# Patient Record
Sex: MALE | Race: BLACK OR AFRICAN AMERICAN | Employment: OTHER | URBAN - METROPOLITAN AREA
[De-identification: names, ages, dates, MRNs, and addresses within clinical notes are randomized per-mention and may not be internally consistent; named-entity substitution may affect disease eponyms.]

---

## 2017-02-02 ENCOUNTER — APPOINTMENT (OUTPATIENT)
Dept: CT IMAGING | Age: 67
End: 2017-02-02
Attending: EMERGENCY MEDICINE
Payer: MEDICARE

## 2017-02-02 ENCOUNTER — HOSPITAL ENCOUNTER (OUTPATIENT)
Age: 67
Setting detail: OBSERVATION
Discharge: HOME HEALTH | End: 2017-02-04
Attending: EMERGENCY MEDICINE | Admitting: FAMILY MEDICINE
Payer: MEDICARE

## 2017-02-02 DIAGNOSIS — I63.9 CEREBROVASCULAR ACCIDENT (CVA), UNSPECIFIED MECHANISM (HCC): Primary | ICD-10-CM

## 2017-02-02 LAB
ALBUMIN SERPL BCP-MCNC: 3.7 G/DL (ref 3.5–5)
ALBUMIN/GLOB SERPL: 1.1 {RATIO} (ref 1.1–2.2)
ALP SERPL-CCNC: 73 U/L (ref 45–117)
ALT SERPL-CCNC: 20 U/L (ref 12–78)
ANION GAP BLD CALC-SCNC: 7 MMOL/L (ref 5–15)
AST SERPL W P-5'-P-CCNC: 12 U/L (ref 15–37)
BASOPHILS # BLD AUTO: 0 K/UL (ref 0–0.1)
BASOPHILS # BLD: 1 % (ref 0–1)
BILIRUB SERPL-MCNC: 0.6 MG/DL (ref 0.2–1)
BUN SERPL-MCNC: 11 MG/DL (ref 6–20)
BUN/CREAT SERPL: 13 (ref 12–20)
CALCIUM SERPL-MCNC: 8.5 MG/DL (ref 8.5–10.1)
CHLORIDE SERPL-SCNC: 99 MMOL/L (ref 97–108)
CO2 SERPL-SCNC: 28 MMOL/L (ref 21–32)
CREAT SERPL-MCNC: 0.86 MG/DL (ref 0.7–1.3)
EOSINOPHIL # BLD: 0.3 K/UL (ref 0–0.4)
EOSINOPHIL NFR BLD: 5 % (ref 0–7)
ERYTHROCYTE [DISTWIDTH] IN BLOOD BY AUTOMATED COUNT: 13.7 % (ref 11.5–14.5)
GLOBULIN SER CALC-MCNC: 3.5 G/DL (ref 2–4)
GLUCOSE BLD STRIP.AUTO-MCNC: 218 MG/DL (ref 65–100)
GLUCOSE SERPL-MCNC: 210 MG/DL (ref 65–100)
HCT VFR BLD AUTO: 38.1 % (ref 36.6–50.3)
HGB BLD-MCNC: 12.7 G/DL (ref 12.1–17)
LYMPHOCYTES # BLD AUTO: 39 % (ref 12–49)
LYMPHOCYTES # BLD: 2.5 K/UL (ref 0.8–3.5)
MCH RBC QN AUTO: 30.1 PG (ref 26–34)
MCHC RBC AUTO-ENTMCNC: 33.3 G/DL (ref 30–36.5)
MCV RBC AUTO: 90.3 FL (ref 80–99)
MONOCYTES # BLD: 0.4 K/UL (ref 0–1)
MONOCYTES NFR BLD AUTO: 6 % (ref 5–13)
NEUTS SEG # BLD: 3.1 K/UL (ref 1.8–8)
NEUTS SEG NFR BLD AUTO: 49 % (ref 32–75)
PLATELET # BLD AUTO: 260 K/UL (ref 150–400)
POTASSIUM SERPL-SCNC: 3.6 MMOL/L (ref 3.5–5.1)
PROT SERPL-MCNC: 7.2 G/DL (ref 6.4–8.2)
RBC # BLD AUTO: 4.22 M/UL (ref 4.1–5.7)
SERVICE CMNT-IMP: ABNORMAL
SODIUM SERPL-SCNC: 134 MMOL/L (ref 136–145)
TROPONIN I SERPL-MCNC: <0.04 NG/ML
WBC # BLD AUTO: 6.3 K/UL (ref 4.1–11.1)

## 2017-02-02 PROCEDURE — 81003 URINALYSIS AUTO W/O SCOPE: CPT | Performed by: EMERGENCY MEDICINE

## 2017-02-02 PROCEDURE — 99285 EMERGENCY DEPT VISIT HI MDM: CPT

## 2017-02-02 PROCEDURE — 85025 COMPLETE CBC W/AUTO DIFF WBC: CPT | Performed by: EMERGENCY MEDICINE

## 2017-02-02 PROCEDURE — 80053 COMPREHEN METABOLIC PANEL: CPT | Performed by: EMERGENCY MEDICINE

## 2017-02-02 PROCEDURE — 36415 COLL VENOUS BLD VENIPUNCTURE: CPT | Performed by: EMERGENCY MEDICINE

## 2017-02-02 PROCEDURE — 93005 ELECTROCARDIOGRAM TRACING: CPT

## 2017-02-02 PROCEDURE — 84484 ASSAY OF TROPONIN QUANT: CPT | Performed by: EMERGENCY MEDICINE

## 2017-02-02 PROCEDURE — 85610 PROTHROMBIN TIME: CPT | Performed by: EMERGENCY MEDICINE

## 2017-02-02 PROCEDURE — 82962 GLUCOSE BLOOD TEST: CPT

## 2017-02-02 PROCEDURE — 70450 CT HEAD/BRAIN W/O DYE: CPT

## 2017-02-02 RX ORDER — NITROGLYCERIN 0.4 MG/1
0.4 TABLET SUBLINGUAL
COMMUNITY

## 2017-02-02 NOTE — IP AVS SNAPSHOT
[address] [facility] [address] [place] [address] 
[phone] Patient: Yvette Boykin MRN: [id number] EUI:5/32/0880 You are allergic to the following No active allergies Recent Documentation Height Weight BMI Smoking Status 1.829 m 98 kg 29.29 kg/m2 Former Smoker Emergency Contacts Name Discharge Info Relation Home Work Mobile Anali Bynum DISCHARGE CAREGIVER [3] Spouse [3] [phone] About your hospitalization You were admitted on:  February 3, 2017 You last received care in the:  [facility] 6S NEURO-SCI TELE You were discharged on:  February 4, 2017 Unit phone number:  [phone] Why you were hospitalized Your primary diagnosis was: Altered Mental Status Providers Seen During Your Hospitalizations Provider Role Specialty Primary office phone Abad Zuleta MD Attending Provider Emergency Medicine [phone] Indigo Burns MD Attending Provider [facility] [phone] Dav Arguello MD Attending Provider Internal Medicine [phone] Sidra Mckeon MD Attending Provider Internal Medicine [phone] Your Primary Care Physician (PCP) Primary Care Physician Office Phone Office Fax Nely Sanders Follow-up Information Follow up With Details Comments Contact Info Luis Deleon MD In 1 week  [address] [place] [address] 
[phone] Nidhi Austin MD In 1 month [address] [place] [address] 
[phone] Current Discharge Medication List  
  
CONTINUE these medications which have NOT CHANGED Dose & Instructions Dispensing Information Comments Morning Noon Evening Bedtime  
 acetaminophen 325 mg tablet Commonly known as:  TYLENOL Your next dose is: Today, Tomorrow Other:  _________ Dose:  650 mg Take 2 Tabs by mouth every six (6) hours as needed. Refills:  0  
     
   
   
   
  
 aspirin delayed-release 325 mg tablet Your next dose is: Today, Tomorrow Other:  _________ Dose:  325 mg Take 325 mg by mouth daily. Refills:  0  
     
   
   
   
  
 atorvastatin 40 mg tablet Commonly known as:  LIPITOR Your next dose is: Today, Tomorrow Other:  _________ Dose:  20 mg Take 20 mg by mouth daily. Refills:  0  
     
   
   
   
  
 clopidogrel 75 mg Tab Commonly known as:  PLAVIX Your next dose is: Today, Tomorrow Other:  _________ Dose:  75 mg Take 75 mg by mouth daily. Refills:  0  
     
   
   
   
  
 metFORMIN 500 mg tablet Commonly known as:  GLUCOPHAGE Your next dose is: Today, Tomorrow Other:  _________ Dose:  500 mg Take 500 mg by mouth two (2) times daily (with meals). Refills:  0  
     
   
   
   
  
 metoprolol tartrate 50 mg tablet Commonly known as:  LOPRESSOR Your next dose is: Today, Tomorrow Other:  _________ Dose:  25 mg Take 25 mg by mouth daily. Refills:  0  
     
   
   
   
  
 NITROSTAT 0.4 mg SL tablet Generic drug:  nitroglycerin Your next dose is: Today, Tomorrow Other:  _________ Dose:  0.4 mg  
0.4 mg by SubLINGual route every five (5) minutes as needed for Chest Pain. Refills:  0 Discharge Instructions Discharge Instructions PATIENT ID: Luis Perea MRN: [id number] YOB: 1950 DATE OF ADMISSION: 2/2/2017 10:27 PM   
DATE OF DISCHARGE: 2/4/2017 PRIMARY CARE PROVIDER: Alix Newby MD  
 
ATTENDING PHYSICIAN: Camila Jarquin* DISCHARGING PROVIDER: Camila Jarquin MD   
To contact this individual call [phone] and ask the  to page. If unavailable ask to be transferred the Adult Hospitalist Department. DISCHARGE DIAGNOSES stroke CONSULTATIONS: IP CONSULT TO HOSPITALIST 
IP CONSULT TO NEUROLOGY PROCEDURES/SURGERIES: * No surgery found * PENDING TEST RESULTS:  
At the time of discharge the following test results are still pending: FOLLOW UP APPOINTMENTS:  
Follow-up Information Follow up With Details Comments Contact Info Ashli Brasher MD In 1 week  [address] 
[phone] Kem Lawrence MD In 1 month [address] 
[phone] ADDITIONAL CARE RECOMMENDATIONS:  
 
DIET: Cardiac Diet ACTIVITY: Activity as tolerated WOUND CARE: none EQUIPMENT needed: none DISCHARGE MEDICATIONS: 
 See Medication Reconciliation Form · It is important that you take the medication exactly as they are prescribed. · Keep your medication in the bottles provided by the pharmacist and keep a list of the medication names, dosages, and times to be taken in your wallet. · Do not take other medications without consulting your doctor. NOTIFY YOUR PHYSICIAN FOR ANY OF THE FOLLOWING:  
Fever over 101 degrees for 24 hours. Chest pain, shortness of breath, fever, chills, nausea, vomiting, diarrhea, change in mentation, falling, weakness, bleeding. Severe pain or pain not relieved by medications. Or, any other signs or symptoms that you may have questions about. DISPOSITION: 
x  Home With: 
 OT  PT  [facility]  RN  
  
 SNF/Inpatient Rehab/LTAC Independent/assisted living Hospice Other:  
 
 
 
Signed:  
Jack Rodriguez MD 
2/4/2017 
10:46 AM 
 
Discharge Orders None [facility] Announcement We are excited to announce that we are making your provider's discharge notes available to you in [employer].   You will see these notes when they are completed and signed by the physician that discharged you from your recent hospital stay. If you have any questions or concerns about any information you see in [employer], please call the Health Information Department where you were seen or reach out to your Primary Care Provider for more information about your plan of care. Introducing [facility]! Charanzoya Montano introduces [employer] patient portal. Now you can access parts of your medical record, email your doctor's office, and request medication refills online. 1. In your internet browser, go to [email or web address] 2. Click on the First Time User? Click Here link in the Sign In box. You will see the New Member Sign Up page. 3. Enter your [employer] Access Code exactly as it appears below. You will not need to use this code after youve completed the sign-up process. If you do not sign up before the expiration date, you must request a new code. · [employer] Access Code: [email or web address] Expires: 5/3/2017 10:36 PM 
 
4. Enter the last four digits of your Social Security Number (xxxx) and Date of Birth (mm/dd/yyyy) as indicated and click Submit. You will be taken to the next sign-up page. 5. Create a [employer] ID. This will be your [employer] login ID and cannot be changed, so think of one that is secure and easy to remember. 6. Create a [employer] password. You can change your password at any time. 7. Enter your Password Reset Question and Answer. This can be used at a later time if you forget your password. 8. Enter your e-mail address. You will receive e-mail notification when new information is available in [address] 9. Click Sign Up. You can now view and download portions of your medical record. 10. Click the Download Summary menu link to download a portable copy of your medical information. If you have questions, please visit the Frequently Asked Questions section of the [employer] website. Remember, [employer] is NOT to be used for urgent needs. For medical emergencies, dial 911. Now available from your iPhone and Android! General Information Please provide this summary of care documentation to your next provider. Patient Signature:  ____________________________________________________________ Date:  ____________________________________________________________  
  
Juli Rhonda Provider Signature:  ____________________________________________________________ Date:  ____________________________________________________________

## 2017-02-03 ENCOUNTER — APPOINTMENT (OUTPATIENT)
Dept: CT IMAGING | Age: 67
End: 2017-02-03
Attending: NURSE PRACTITIONER
Payer: MEDICARE

## 2017-02-03 ENCOUNTER — APPOINTMENT (OUTPATIENT)
Dept: MRI IMAGING | Age: 67
End: 2017-02-03
Attending: NURSE PRACTITIONER
Payer: MEDICARE

## 2017-02-03 PROBLEM — R41.82 ALTERED MENTAL STATUS: Status: ACTIVE | Noted: 2017-02-03

## 2017-02-03 LAB
AMMONIA PLAS-SCNC: 29 UMOL/L
ANION GAP BLD CALC-SCNC: 8 MMOL/L (ref 5–15)
APPEARANCE UR: CLEAR
ATRIAL RATE: 60 BPM
ATRIAL RATE: 62 BPM
BASOPHILS # BLD AUTO: 0 K/UL (ref 0–0.1)
BASOPHILS # BLD: 1 % (ref 0–1)
BILIRUB UR QL: NEGATIVE
BUN SERPL-MCNC: 11 MG/DL (ref 6–20)
BUN/CREAT SERPL: 16 (ref 12–20)
CALCIUM SERPL-MCNC: 8.6 MG/DL (ref 8.5–10.1)
CALCULATED P AXIS, ECG09: 45 DEGREES
CALCULATED P AXIS, ECG09: 56 DEGREES
CALCULATED R AXIS, ECG10: -19 DEGREES
CALCULATED R AXIS, ECG10: -20 DEGREES
CALCULATED T AXIS, ECG11: -57 DEGREES
CALCULATED T AXIS, ECG11: -74 DEGREES
CHLORIDE SERPL-SCNC: 103 MMOL/L (ref 97–108)
CHOLEST SERPL-MCNC: 112 MG/DL
CO2 SERPL-SCNC: 28 MMOL/L (ref 21–32)
COLOR UR: ABNORMAL
CREAT SERPL-MCNC: 0.7 MG/DL (ref 0.7–1.3)
DIAGNOSIS, 93000: NORMAL
DIAGNOSIS, 93000: NORMAL
EOSINOPHIL # BLD: 0.3 K/UL (ref 0–0.4)
EOSINOPHIL NFR BLD: 5 % (ref 0–7)
ERYTHROCYTE [DISTWIDTH] IN BLOOD BY AUTOMATED COUNT: 13.4 % (ref 11.5–14.5)
EST. AVERAGE GLUCOSE BLD GHB EST-MCNC: 137 MG/DL
GLUCOSE BLD STRIP.AUTO-MCNC: 115 MG/DL (ref 65–100)
GLUCOSE BLD STRIP.AUTO-MCNC: 135 MG/DL (ref 65–100)
GLUCOSE BLD STRIP.AUTO-MCNC: 143 MG/DL (ref 65–100)
GLUCOSE BLD STRIP.AUTO-MCNC: 151 MG/DL (ref 65–100)
GLUCOSE SERPL-MCNC: 153 MG/DL (ref 65–100)
GLUCOSE UR STRIP.AUTO-MCNC: 250 MG/DL
HBA1C MFR BLD: 6.4 % (ref 4.2–6.3)
HCT VFR BLD AUTO: 37.3 % (ref 36.6–50.3)
HDLC SERPL-MCNC: 49 MG/DL
HDLC SERPL: 2.3 {RATIO} (ref 0–5)
HGB BLD-MCNC: 12.4 G/DL (ref 12.1–17)
HGB UR QL STRIP: NEGATIVE
INR PPP: 1 (ref 0.9–1.1)
KETONES UR QL STRIP.AUTO: NEGATIVE MG/DL
LDLC SERPL CALC-MCNC: 48.2 MG/DL (ref 0–100)
LEUKOCYTE ESTERASE UR QL STRIP.AUTO: NEGATIVE
LIPID PROFILE,FLP: NORMAL
LYMPHOCYTES # BLD AUTO: 43 % (ref 12–49)
LYMPHOCYTES # BLD: 2.6 K/UL (ref 0.8–3.5)
MAGNESIUM SERPL-MCNC: 1.9 MG/DL (ref 1.6–2.4)
MCH RBC QN AUTO: 29.7 PG (ref 26–34)
MCHC RBC AUTO-ENTMCNC: 33.2 G/DL (ref 30–36.5)
MCV RBC AUTO: 89.4 FL (ref 80–99)
MONOCYTES # BLD: 0.5 K/UL (ref 0–1)
MONOCYTES NFR BLD AUTO: 8 % (ref 5–13)
NEUTS SEG # BLD: 2.5 K/UL (ref 1.8–8)
NEUTS SEG NFR BLD AUTO: 43 % (ref 32–75)
NITRITE UR QL STRIP.AUTO: NEGATIVE
P-R INTERVAL, ECG05: 156 MS
P-R INTERVAL, ECG05: 160 MS
PH UR STRIP: 6 [PH] (ref 5–8)
PHOSPHATE SERPL-MCNC: 3.7 MG/DL (ref 2.6–4.7)
PLATELET # BLD AUTO: 237 K/UL (ref 150–400)
POTASSIUM SERPL-SCNC: 3.7 MMOL/L (ref 3.5–5.1)
PROT UR STRIP-MCNC: NEGATIVE MG/DL
PROTHROMBIN TIME: 10.6 SEC (ref 9–11.1)
Q-T INTERVAL, ECG07: 402 MS
Q-T INTERVAL, ECG07: 432 MS
QRS DURATION, ECG06: 72 MS
QRS DURATION, ECG06: 72 MS
QTC CALCULATION (BEZET), ECG08: 408 MS
QTC CALCULATION (BEZET), ECG08: 432 MS
RBC # BLD AUTO: 4.17 M/UL (ref 4.1–5.7)
SERVICE CMNT-IMP: ABNORMAL
SODIUM SERPL-SCNC: 139 MMOL/L (ref 136–145)
SP GR UR REFRACTOMETRY: 1.01 (ref 1–1.03)
T4 FREE SERPL-MCNC: 1 NG/DL (ref 0.8–1.5)
TRIGL SERPL-MCNC: 74 MG/DL (ref ?–150)
TROPONIN I SERPL-MCNC: <0.04 NG/ML
TSH SERPL DL<=0.05 MIU/L-ACNC: 2.28 UIU/ML (ref 0.36–3.74)
UROBILINOGEN UR QL STRIP.AUTO: 1 EU/DL (ref 0.2–1)
VENTRICULAR RATE, ECG03: 60 BPM
VENTRICULAR RATE, ECG03: 62 BPM
VLDLC SERPL CALC-MCNC: 14.8 MG/DL
WBC # BLD AUTO: 5.9 K/UL (ref 4.1–11.1)

## 2017-02-03 PROCEDURE — 82962 GLUCOSE BLOOD TEST: CPT

## 2017-02-03 PROCEDURE — 70496 CT ANGIOGRAPHY HEAD: CPT

## 2017-02-03 PROCEDURE — 80048 BASIC METABOLIC PNL TOTAL CA: CPT | Performed by: FAMILY MEDICINE

## 2017-02-03 PROCEDURE — 82140 ASSAY OF AMMONIA: CPT | Performed by: FAMILY MEDICINE

## 2017-02-03 PROCEDURE — 84484 ASSAY OF TROPONIN QUANT: CPT | Performed by: FAMILY MEDICINE

## 2017-02-03 PROCEDURE — 97161 PT EVAL LOW COMPLEX 20 MIN: CPT

## 2017-02-03 PROCEDURE — A9270 NON-COVERED ITEM OR SERVICE: HCPCS | Performed by: FAMILY MEDICINE

## 2017-02-03 PROCEDURE — 99218 HC RM OBSERVATION: CPT

## 2017-02-03 PROCEDURE — 74011000258 HC RX REV CODE- 258: Performed by: FAMILY MEDICINE

## 2017-02-03 PROCEDURE — 74011636637 HC RX REV CODE- 636/637: Performed by: FAMILY MEDICINE

## 2017-02-03 PROCEDURE — A9585 GADOBUTROL INJECTION: HCPCS | Performed by: FAMILY MEDICINE

## 2017-02-03 PROCEDURE — 83036 HEMOGLOBIN GLYCOSYLATED A1C: CPT | Performed by: FAMILY MEDICINE

## 2017-02-03 PROCEDURE — 97116 GAIT TRAINING THERAPY: CPT

## 2017-02-03 PROCEDURE — 83735 ASSAY OF MAGNESIUM: CPT | Performed by: FAMILY MEDICINE

## 2017-02-03 PROCEDURE — 74011636320 HC RX REV CODE- 636/320: Performed by: FAMILY MEDICINE

## 2017-02-03 PROCEDURE — 70498 CT ANGIOGRAPHY NECK: CPT

## 2017-02-03 PROCEDURE — G8979 MOBILITY GOAL STATUS: HCPCS

## 2017-02-03 PROCEDURE — 36415 COLL VENOUS BLD VENIPUNCTURE: CPT | Performed by: FAMILY MEDICINE

## 2017-02-03 PROCEDURE — 93005 ELECTROCARDIOGRAM TRACING: CPT

## 2017-02-03 PROCEDURE — 70553 MRI BRAIN STEM W/O & W/DYE: CPT

## 2017-02-03 PROCEDURE — 80061 LIPID PANEL: CPT | Performed by: FAMILY MEDICINE

## 2017-02-03 PROCEDURE — 84443 ASSAY THYROID STIM HORMONE: CPT | Performed by: FAMILY MEDICINE

## 2017-02-03 PROCEDURE — 74011250636 HC RX REV CODE- 250/636: Performed by: FAMILY MEDICINE

## 2017-02-03 PROCEDURE — 85025 COMPLETE CBC W/AUTO DIFF WBC: CPT | Performed by: FAMILY MEDICINE

## 2017-02-03 PROCEDURE — G8978 MOBILITY CURRENT STATUS: HCPCS

## 2017-02-03 PROCEDURE — 84439 ASSAY OF FREE THYROXINE: CPT | Performed by: FAMILY MEDICINE

## 2017-02-03 PROCEDURE — 84100 ASSAY OF PHOSPHORUS: CPT | Performed by: FAMILY MEDICINE

## 2017-02-03 RX ORDER — ATORVASTATIN CALCIUM 40 MG/1
20 TABLET, FILM COATED ORAL DAILY
COMMUNITY
End: 2018-05-21

## 2017-02-03 RX ORDER — SODIUM CHLORIDE 0.9 % (FLUSH) 0.9 %
10 SYRINGE (ML) INJECTION
Status: COMPLETED | OUTPATIENT
Start: 2017-02-03 | End: 2017-02-03

## 2017-02-03 RX ORDER — MAGNESIUM SULFATE 100 %
4 CRYSTALS MISCELLANEOUS AS NEEDED
Status: DISCONTINUED | OUTPATIENT
Start: 2017-02-03 | End: 2017-02-04 | Stop reason: HOSPADM

## 2017-02-03 RX ORDER — DEXTROSE 50 % IN WATER (D50W) INTRAVENOUS SYRINGE
12.5-25 AS NEEDED
Status: DISCONTINUED | OUTPATIENT
Start: 2017-02-03 | End: 2017-02-04 | Stop reason: HOSPADM

## 2017-02-03 RX ORDER — ASPIRIN 325 MG
325 TABLET, DELAYED RELEASE (ENTERIC COATED) ORAL DAILY
COMMUNITY

## 2017-02-03 RX ORDER — SODIUM CHLORIDE 0.9 % (FLUSH) 0.9 %
5-10 SYRINGE (ML) INJECTION EVERY 8 HOURS
Status: DISCONTINUED | OUTPATIENT
Start: 2017-02-03 | End: 2017-02-04 | Stop reason: HOSPADM

## 2017-02-03 RX ORDER — SODIUM CHLORIDE 0.9 % (FLUSH) 0.9 %
5-10 SYRINGE (ML) INJECTION AS NEEDED
Status: DISCONTINUED | OUTPATIENT
Start: 2017-02-03 | End: 2017-02-04 | Stop reason: HOSPADM

## 2017-02-03 RX ORDER — INSULIN LISPRO 100 [IU]/ML
INJECTION, SOLUTION INTRAVENOUS; SUBCUTANEOUS
Status: DISCONTINUED | OUTPATIENT
Start: 2017-02-03 | End: 2017-02-04 | Stop reason: HOSPADM

## 2017-02-03 RX ADMIN — Medication 10 ML: at 14:00

## 2017-02-03 RX ADMIN — Medication 10 ML: at 07:07

## 2017-02-03 RX ADMIN — SODIUM CHLORIDE 100 ML: 900 INJECTION, SOLUTION INTRAVENOUS at 11:07

## 2017-02-03 RX ADMIN — INSULIN LISPRO 2 UNITS: 100 INJECTION, SOLUTION INTRAVENOUS; SUBCUTANEOUS at 12:44

## 2017-02-03 RX ADMIN — Medication 10 ML: at 11:07

## 2017-02-03 RX ADMIN — INSULIN LISPRO 2 UNITS: 100 INJECTION, SOLUTION INTRAVENOUS; SUBCUTANEOUS at 07:06

## 2017-02-03 RX ADMIN — IOPAMIDOL 100 ML: 755 INJECTION, SOLUTION INTRAVENOUS at 11:07

## 2017-02-03 RX ADMIN — GADOBUTROL 10 ML: 604.72 INJECTION INTRAVENOUS at 15:00

## 2017-02-03 NOTE — ED TRIAGE NOTES
Patient arrives to ED with c/o left extremity weakness which started @ 1200 noon, patient also c/o confusion, and possible left side facial droop, all of which patient and wife says has resolved at this time, patient states he has had 6 \"stroke\" in the past, last being @ May.

## 2017-02-03 NOTE — CONSULTS
Neurology Consult    Patient: Uriel Monaco MRN: [id number]  SSN: [id number]    YOB: 1950  Age: 74 y.o. Sex: male      Subjective:      Uriel Monaco is a 74 y.o. male who is being seen for acute episode yesterday of  Dizziness, confusion, dysarthria,dysphasia ataxia facial changes, left sided weakness LKW at 1100. No medical evaluation  till 2200. He also noted :\"tongue swelling\". Today all sx have resolved with exception of slurred speech, Left facial droop and left sided weakness without numbness. Pt notes above sx are improved from yesterday. Pt has pertinent hx of multiple strokes last one documented was Left cerebellar infarction 2/2016. He normally goes to [place] for his ongoing medical conditions. He had been evalauted for vertebral artery stent placement at [facility]. Pt was deemed not a candidate aftter angiogram demonstrated less stenosis than was originally assessed. Included significant risk factors including DMII,  well controlled, HTN moderate controlled and CAD. Suspected Vertebrobasilar disease. Past Medical History   Diagnosis Date    Diabetes ([address]      type 2    Hypertension     MI, old     Stroke [facility])      Past Surgical History   Procedure Laterality Date    Hx appendectomy      Hx hernia repair        History reviewed. No pertinent family history.   Social History   Substance Use Topics    Smoking status: Former Smoker    Smokeless tobacco: Never Used    Alcohol use No      Current Facility-Administered Medications   Medication Dose Route Frequency Provider Last Rate Last Dose    sodium chloride (NS) flush 5-10 mL  5-10 mL IntraVENous Q8H Surekha Gordon MD   10 mL at 02/03/17 0707    sodium chloride (NS) flush 5-10 mL  5-10 mL IntraVENous PRN Surekha Gordon MD        glucose chewable tablet 16 g  4 Tab Oral PRN Surekha Gordon MD        dextrose (D50W) injection syrg 12.5-25 g  12.5-25 g IntraVENous PRN Surekha Gordon MD        glucagon (GLUCAGEN) injection 1 mg  1 mg IntraMUSCular PRN Indigo Burns MD        insulin lispro (HUMALOG) injection   SubCUTAneous AC&HS Indigo Burns MD   2 Units at 02/03/17 0706        No Known Allergies    Review of Systems:  A comprehensive review of systems was negative except for that written in the History of Present Illness. Objective:     Vitals:    02/03/17 0634 02/03/17 0635 02/03/17 0800 02/03/17 0900   BP:   148/85 153/84   Pulse: 61 60 65 74   Resp: 8 8 10 17   Temp:   97.6 °F (36.4 °C)    SpO2: 97% 97% 98% 98%   Weight:       Height:            Physical Exam:  GENERAL: alert, cooperative, no distress, appears stated age  LYMPHATIC: Cervical, supraclavicular, and axillary nodes normal.   THROAT & NECK: normal and no erythema or exudates noted. LUNG: clear to auscultation bilaterally  HEART: regular rate and rhythm, S1, S2 normal, no murmur, click, rub or gallop  ABDOMEN: soft, non-tender. Bowel sounds normal. No masses,  no organomegaly  EXTREMITIES:  extremities normal, atraumatic, no cyanosis or edema  SKIN: Normal.  PSYCHIATRIC: non focal    Neurologic Exam:  Mental Status:  Alert and oriented x 4. Appropriate affect, mood and behavior. Language:    Mild dysarthria, understandable   Cranial Nerves:   Pupils equal, round and reactive to light. Visual fields full to confrontation. Extraocular movements intact. End gaze nystagmus bilaterally, L>R. L facial droop with L tongue deviate to Right     Hearing intact bilaterally. Shoulder shrug 5/5 bilaterally. Motor:    No pronator drift. Bulk and tone normal.      5/5 power in all extremities proximally and distally. No involuntary movements. Sensation:    Sensation intact throughout to light touch,    Reflexes:    Reflexes are 2+ at the biceps, triceps, patella and achilles     bilaterally.  Negative Babinskis    Coordination & Gait: deferrred      Imaging:    MRI pending, CTA head and neck pending    Assessment:     Hospital Problems  Date Reviewed: 2/3/2017          Codes Class Noted POA    * (Principal)Altered mental status ICD-10-CM: R41.82  ICD-9-CM: 780.97  2/3/2017 Unknown              Plan:     1. Pt has been managed prior to sx with optimal best management, including BP control, DM control, statin therapy and daily ASA 81 mg. He appears to compliant with meds. 2. He has sustained multiple CVAs. Per subjective history. One documented in BSR. 3. Neuro exam today demonstrates slurred speech, with noted improvement per patient. 4. CTA head and neck, MRI ordered, results pending at time of this assessment. 5. Will reexamine the VB contributing. to possible new stroke.     Signed By: Jeaneth Alexander NP     February 3, 2017

## 2017-02-03 NOTE — PROGRESS NOTES
8842:  Attempted to call ED back to get report, Renetta Huynh to call back with report  0333: TRANSFER - IN REPORT:  Verbal report received from Sarah(name) on nUa Ureña  being received from ED(unit) for routine progression of care  Report consisted of patients Situation, Background, Assessment and   Recommendations(SBAR). Information from the following report(s) SBAR, Kardex, ED Summary, Intake/Output and Recent Results was reviewed with the receiving nurse. Opportunity for questions and clarification was provided. Assessment completed upon patients arrival to unit and care assumed. 0354: To ICU 11 with nurse/monitor                 .

## 2017-02-03 NOTE — PROGRESS NOTES
Spiritual Care Assessment/Progress Notes    Nerissa Fiore [id number]  [id number]    1950  74 y.o.  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 [facility] 02/26/2016    CAD (coronary artery disease) 02/09/2016    Hypertension 02/09/2016    Type II diabetes mellitus ([facility] 02/09/2016    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   []         Not able to assess due to medical condition    Services Provided Today:  []         crisis intervention    []         reading Scriptures  [x]         spiritual assessment    []         prayer  []         empathic listening/emotional support  []         rites and rituals (cite in comments)  []         life review     []         Moravian support  []         theological development   []         advocacy  []         ethical dialog     []         blessing  []         bereavement support    []         support to family  []         anticipatory grief support   [x]         help with AMD  []         spiritual guidance    []         meditation      Spiritual Care Needs  []         Emotional Support  []         Spiritual/Jewish Care  []         Loss/Adjustment  []         Advocacy/Referral                /Ethics  [x]         No needs expressed at               this time  []         Other: (note in               comments)  [address]  []         Follow up visits with               pt/family  []         Provide materials  []         Schedule sacraments  []         Contact Community               Clergy  [x]         Follow up as needed  []         Other: (note in               comments)     Comments: We received an in-basket request to consult with Mr. Brittany Benitez about completing an AMD. I spoke with him and his wife about it and his wife said that she has a copy of his completed AMD at home. She plans to bring it in to be placed in his file here. They had no additional spiritual/pastoral care needs at the time, and I encouraged Mr. Brittany Benitez to have us paged at any time if needed. Rev. Brynn Serrano M.Div, [facility]

## 2017-02-03 NOTE — PROGRESS NOTES
Problem: Mobility Impaired (Adult and Pediatric)  Goal: *Acute Goals and Plan of Care (Insert Text)  Physical Therapy Goals  Initiated 2/3/2017  1. Patient will move from supine to sit and sit to supine , scoot up and down and roll side to side in bed with independence within 7 day(s). 2. Patient will transfer from bed to chair and chair to bed with supervision/set-up using the least restrictive device within 7 day(s). 3. Patient will perform sit to stand with supervision/set-up within 7 day(s). 4. Patient will ambulate with supervision/set-up for 150 feet with the least restrictive device within 7 day(s). 5. Patient will ascend/descend 6 stairs with 2 handrail(s) with supervision/set-up within 7 day(s). 6. Patient will improve Garcia Balance score by 7 points within 7 days. PHYSICAL THERAPY EVALUATION- NEURO POPULATION     Patient: Louana Osler (52 y.o. male)  Date: 2/3/2017  Primary Diagnosis: altered mental status/ dizziness/ataxia        Precautions:   Fall      ASSESSMENT :  Based on the objective data described below, the patient presents with decreased independence with mobility compared to baseline level prior to admission due impaired balance and decreased safety awareness. Patient cleared by nursing for mobility and agreeable to participate in mobility assessment. Patient vital signs within normal limits. Patient able to perform bed mobility and achieve EOB sitting with supervision and additional time. Once sitting, patient participated in strength assessment revealing no strength deficits. No reports of increased pain/discomfort in sitting. Patient performed sit<>stand tx with CGA, additional time, and demonstrates even WB bilaterally and good immediate standing balance with UE support of RW. Patient able to ambulate with CGA and RW x 150 feet with short, shuffled steps. Patient reports he uses a rollator at home and takes seated rest breaks as needed. PMH significant for previous CVAs.  Patient returned to room and participated in [address] standing in front of chair. He had difficulty on items where he could not use additional support and narrow YEHUDA. He a moderate fall risk at this time. Educated patient on impaired balance and encouraged mobility with assistance/supervision. Will continue to follow to progress towards acute care goals. Recommend outpatient vs HHPT at d/c to continue to optimize independence and safety with mobility. Next session, would like to assess stairs. Patient will benefit from skilled intervention to address the above impairments. Patients rehabilitation potential is considered to be Good  Factors which may influence rehabilitation potential include:   [ ]           None noted  [ ]           Mental ability/status  [ ]           Medical condition  [ ]           Home/family situation and support systems  [ ]           Safety awareness  [ ]           Pain tolerance/management  [ ]           Other:        PLAN :  Recommendations and Planned Interventions:  [ ]             Bed Mobility Training             [ ]      Neuromuscular Re-Education  [ ]             Transfer Training                   [ ]      Orthotic/Prosthetic Training  [ ]             Gait Training                         [ ]      Modalities  [ ]             Therapeutic Exercises           [ ]      Edema Management/Control  [ ]             Therapeutic Activities            [ ]      Patient and Family Training/Education  [ ]             Other (comment):  Frequency/Duration: Patient will be followed by physical therapy 5 times a week to address goals. Discharge Recommendations: Home Health and Outpatient  Further Equipment Recommendations for Discharge: continue with rollator       SUBJECTIVE:   Patient stated I just dont know what happened to me.       OBJECTIVE DATA SUMMARY:   HISTORY:    Past Medical History   Diagnosis Date    Diabetes ([address]         type 2    Hypertension      MI, old     [facility] St[facility])       Past Surgical History   Procedure Laterality Date    Hx appendectomy        Hx hernia repair         Prior Level of Function/Home Situation: Mod I. Lives with wife. Personal factors and/or comorbidities impacting plan of care:      Home Situation  Home Environment: Private residence  # Steps to Enter: 3  Rails to Enter: Yes  Hand Rails : Bilateral  One/Two Story Residence: One story  Living Alone: No  Support Systems: Spouse/Significant Other/Partner  Patient Expects to be Discharged to[de-identified] Private residence  Current DME Used/Available at Home: Johnette Severe, rollator, Walker, rolling     EXAMINATION/PRESENTATION/DECISION MAKING:   Critical Behavior:  Neurologic State: Eyes open to voice, Sleeping  Orientation Level: Oriented X4  Cognition: Follows commands, Appropriate decision making, Appropriate for age attention/concentration, Appropriate safety awareness        Strength:    Strength: Within functional limits                    Coordination:  Coordination: Within functional limits  Tone & Sensation:   Tone: Normal              Sensation: Intact               Range Of Motion:  AROM: Within functional limits                       Functional Mobility:  Bed Mobility:        Sit to Supine: Supervision     Transfers:  Sit to Stand: Contact guard assistance  Stand to Sit: Contact guard assistance  Stand Pivot Transfers: Contact guard assistance                    Balance:   Sitting: Intact  Standing: Intact; With support  Ambulation/Gait Training:  Distance (ft): 150 Feet (ft)  Assistive Device: Gait belt;Walker, rolling  Ambulation - Level of Assistance: Contact guard assistance     Gait Description (WDL): Exceptions to WDL  Gait Abnormalities: Decreased step clearance;Shuffling gait (Decreased knee and hip flexion with swing phase)        Base of Support: Widened     Speed/Kristen: Slow  Step Length: Right shortened;Left shortened                               Functional Measure  Garcia Balance Test: Sitting to Standing: 3  Standing Unsupported: 1  Sitting with Back Unsupported: 4  Standing to Sitting: 3  Transfers: 2  Standing Unsupported with Eyes Closed: 2  Standing Unsupported with Feet Together: 0  Reach Forward with Outstretched Arm: 1   Object: 3  Turn to Look Over Shoulders: 3  Turn 360 Degrees: 3  Alternate Foot on Step/Stool: 2  Standing Unsupported One Foot in Front: 0  Stand on One Le  Total: 27             56=Maximum possible score;   0-20=High fall risk  21-40=Moderate fall risk   41-56=Low fall risk      Garcia Balance Test and G-code impairment scale:  Percentage of Impairment CH     0%    CI     1-19% CJ     20-39% CK     40-59% CL     60-79% CM     80-99% CN      100%   Garcia   Score 0-56 56 45-55 34-44 23-33 12-22 1-11 0         G codes: In compliance with CMSs Claims Based Outcome Reporting, the following G-code set was chosen for this patient based on their primary functional limitation being treated: The outcome measure chosen to determine the severity of the functional limitation was the Lizzie Moynahan with a score of 28/56 which was correlated with the impairment scale.       · Mobility - Walking and Moving Around:               - CURRENT STATUS:    CK - 40%-59% impaired, limited or restricted               - GOAL STATUS:           CJ - 20%-39% impaired, limited or restricted               - D/C STATUS:                       ---------------To be determined---------------       Physical Therapy Evaluation Charge Determination   History Examination Presentation Decision-Making   MEDIUM  Complexity : 1-2 comorbidities / personal factors will impact the outcome/ POC  LOW Complexity : 1-2 Standardized tests and measures addressing body structure, function, activity limitation and / or participation in recreation  LOW Complexity : Stable, uncomplicated  LOW Complexity : FOTO score of       Based on the above components, the patient evaluation is determined to be of the following complexity level: LOW         Pain:  Pain Scale 1: Numeric (0 - 10)  Pain Intensity 1: 0              Activity Tolerance:   No apparent distress  Please refer to the flowsheet for vital signs taken during this treatment. After treatment:   [ ]     Patient left in no apparent distress sitting up in chair  [X]     Patient left in no apparent distress in bed  [X]     Call bell left within reach  [X]     Nursing notified  [ ]     Caregiver present  [ ]     Bed alarm activated      COMMUNICATION/EDUCATION:   The patients plan of care was discussed with: Registered Nurse. Patient was educated regarding His deficit(s) of impaired balance as this relates to His diagnosis of AMS. He demonstrated Good understanding as evidenced by verbal recall and demonstration. [X]  Fall prevention education was provided and the patient/caregiver indicated understanding. [X]  Patient/family have participated as able in goal setting and plan of care. [X]  Patient/family agree to work toward stated goals and plan of care. [ ]  Patient understands intent and goals of therapy, but is neutral about his/her participation. [ ]  Patient is unable to participate in goal setting and plan of care.      Thank you for this referral.  Vannessa Ochoa, PT , DPT   Time Calculation: 21 mins

## 2017-02-03 NOTE — H&P
[address] [facility] [place] [place] [address]   HISTORY AND PHYSICAL       Name:  Barbara Hernandez   MR#:  [id number]   :  1950   Account #:  [de-identified]        Date of Adm:  2017       CHIEF COMPLAINT: Dizziness. HISTORY OF PRESENT ILLNESS: A 38-year-old    male with past medical history of type 2 diabetes mellitus, stroke and   myocardial infarction, hypertension, presented to the emergency   department from home with reported chief complaint of dizziness. At   current, the patient is a limited historian. Majority of history had been   obtained from review of ED and medical reports. Per the ER, initial   reports, the patient had dizziness, left-sided facial tremors, confusion,   balance problems, tongue swelling, difficulty speaking and difficulty   swallowing which started yesterday. The patient does not provide   much in terms of further details regarding the same. He also reportedly   had a headache starting yesterday afternoon. He notes the symptoms   have resolved. He notes that his tongue was swollen, but he was   \"eating. \" He denies taking any new medications, new products or   eating any new foods. The ER also noted the patient's wife noted the   patient had less controlled of his hand movements. Reportedly, he has   had 5-6 strokes in 2016 with some residual hearing loss and gait   abnormality. He reportedly, however, has been able to ambulate using   a walker. There is no reports of syncope, loss of consciousness, neck   pain, back pain, chest pain, shortness of breath, cough, congestion,   abdominal pain, nausea, vomiting, diarrhea, melena, dysuria,   hematuria, calf pain, swelling, edema, fever, chills, cough or falls. On   arrival in the emergency department, initial recorded vital signs were   blood pressure 161/92, heart rate 76, respiratory rate 16, saturation   97% on room air.  CT of the head without contrast showed no acute intracranial abnormalities with evolution of chronic left cerebellar infarct   since last year. The patient is now seen for admission to the hospitalist   service for continued evaluation and treatment. PAST MEDICAL HISTORY: Type 2 diabetes mellitus, stroke, hearing   impairment, gait abnormality. Myocardial infarction, hypertension. PAST SURGICAL HISTORY:   1. Appendectomy. 2. Hernia repair. MEDICATIONS:   1. Tylenol 650 mg p.o. q.6h. p.r.n.   2. Aspirin 81 mg p.o. daily. 3. Atorvastatin 20 mg p.o. at bedtime. 4. Clopidogrel 75 mg p.o. daily. 5. Metformin 500 mg p.o. b.i.d.   6. Metoprolol 25 mg p.o. b.i.d.   7. Nitroglycerin 0.4 mg sublingual q.5 minutes p.r.n. ALLERGIES: NO KNOWN DRUG ALLERGIES. SOCIAL HISTORY: Reportedly quit smoking cigarettes 20 years ago. Positive occasional alcohol. Denies daily consumption. Denies illicit   drugs. FAMILY HISTORY: Stroke - mother. Heart disease - mother. REVIEW OF SYSTEMS   An 11 systems reviewed, pertinent positives as HPI, otherwise   negative. PHYSICAL EXAMINATION   VITAL SIGNS: Temperature is 98.4 degrees Fahrenheit, blood   pressure 142/78, heart rate 60, respiratory rate 14, O2 saturation 99%   on room air. Recorded weight is 222 pounds (100.7 kg). Recorded   height 6 foot 0 inches tall. GENERAL: Overweight male in no acute respiratory distress. PSYCHIATRIC: The patient is awake, alert, oriented x3. NEUROLOGIC: Best response. Moves extremities x4. Sensation is   grossly intact without slurred speech, facial droop, no pronator drift. HEENT: Normocephalic, atraumatic. PERRLA is intact. Sclerae are   anicteric. Conjunctivae clear. Nares are patent. Oropharynx clear. Tongue is midline, nonedematous. NECK: Supple, without lymphadenopathy, JVD, carotid bruits, no   thyromegaly. Nontender, full range of motion. No acute palpable   cervical deformity. No stridor. LYMPH: Negative for cervical, supraclavicular adenopathy. RESPIRATORY: Lungs are clear to auscultation bilaterally. CARDIOVASCULAR: Heart regular rate and rhythm, normal S1, S2,   no murmurs, rubs or gallops. GI: Abdomen soft, nontender, nondistended. Normoactive bowel   sounds. No guarding or rigidity. No auscultated abdominal sounds. No   palpable abdominal mass. BACK: No CVA tenderness. No step-off deformity. MUSCULOSKELETAL: Full range of motion. Negative for calf   tenderness. VASCULAR: 2+ radial, 1+ dorsalis pedis pulses, without cyanosis. Positive for clubbing and negative for edema. SKIN: Warm and dry. LABORATORY DATA: I REVIEWED AS FOLLOWS: Sodium of 134,   potassium 3.6, chloride 99, CO2 of 28, BUN of 11, creatinine of 0.86,   glucose 210, anion gap 7, calcium is 8.5, GFR greater than 60, total   bilirubin 0.6, total protein 7.2, albumin 3.7, ALT 20, AST is 12, alkaline   phosphatase is 73. Troponin I of less than 0.04. WBC 6.3, hemoglobin   12.7, hematocrit 38.1, platelets of 949, neutrophils at 49%. Urinalysis:   Leukocyte esterase negative, nitrites negative, urobilinogen 1.0,   bilirubin negative, blood negative, ketones, negative. Glucose 250,   protein negative, pH 6.0, specific gravity 1.015, INR 1.0, PT of 10.6. CT of the head without contrast: Results reviewed as noted in HPI. A   12-lead EKG: Normal sinus rhythm, ST and T-wave changes in inferior   lateral leads at 60 beats per minute. IMPRESSION: A 67-year-old male with noted past medical history,   now admitted with altered mental status, ataxia, dizziness, tremors,   dysarthria, dysphagia. 1. Altered mental status. At this time, admit the patient for observation. Placed on neurovascular checks, fall precautions. Consult with   neurologist. Consider for vertebrobasilar insufficiency. Consult with   physical and occupational therapists. 2. Ataxia. Plan as noted above. 3. Dizziness. Plan as noted. 4. Dysarthria, currently, no noted speech impairment.  Order dysphagia screen. If he were not to pass, then proceed with speech pathology   evaluation. Tongue edema not present on exam. There is no evidence   of any airway obstruction. The patient is otherwise in no acute distress. 5. Hypertension. Resume back on home medicines if he passes   dysphagia screen. 6. Type 2 diabetes mellitus. Placed on Humalog correctional coverage,   scheduled Accu-Cheks. 7. Tremors, currently resolved. 8. Venous thromboembolism prophylaxis. Sequential compression   devices, bilateral lower extremities. CHRIS Esquivel MD      MP / CD   D:  02/03/2017   04:22   T:  02/03/2017   04:59   Job #:  [id number]

## 2017-02-03 NOTE — PROGRESS NOTES
Care Management: Chart reviewed. Patient driven by spouse to ED for dizziness, left sided facial tremors,difficulty with swallowing & speaking, & dropping things. Admitted to ICU for neuro checks. PMH: Type 2 DM, MI, HTN, multiple strokes. I spoke to patient at bedside & explained role of Care Management. Confirmed address, phone, PCP, & insurance. Patient has an Advanced Directive & his wife, Hilary Chowdhury at [phone], is the medical POA. Prescriptions are filled at the [facility] on [address]. Living Situation: Patient lives with his wife in a one story home with 3 entry steps. DME includes walker, hospital bed, & bedside table. He has had Home Health previously, but could not recall vendor name. Per patient, he has been independent with ADL's & IADL's, except driving. His wife drives him to appointments. Disposition Planning: Patient currently here under Observation status. He is hopeful to be discharged home, noting that his wife will be able to drive him home. Care Management will continue to follow. Sussy RN BSN CCM     Care Management Interventions  PCP Verified by CM: Yes (PCP: Dr. Yg Jean-Baptiste at [phone].)  Last Visit to PCP: 01/27/17  Palliative Care Consult (Criteria: CHF and RRAT>21): No  Reason for No Palliative Care Consult: Other (see comment) (Does not meet criteria.  )  Mode of Transport at Discharge:  Other (see comment) (Patient's wife, Hilary Chowdhury at [phone], will be able to drive patient home at discharge.)  Transition of Care Consult (CM Consult):  (TBD, likely home with no services.  )  MyChart Signup: No  Discharge Durable Medical Equipment: No  Physical Therapy Consult: No  Occupational Therapy Consult: No  Speech Therapy Consult: No  Current Support Network: Lives with Spouse, Own Home (Lives with wife in one story home, with 3 entry steps.)  Confirm Follow Up Transport: Family (Family will be able to drive patient home. )  Plan discussed with Pt/Family/Caregiver: Yes  Discharge Location  Discharge Placement: Home (Anticipate discharge home. )

## 2017-02-03 NOTE — ED PROVIDER NOTES
HPI Comments: 74 y.o. male with past medical history significant for Type II DM, MI, HTN, stroke, appendectomy and hernia repair who presents from home with chief complaint of dizziness. Pt c/o dizziness, left sided facial tremors, confusion, balance problems and tongue swelling that started at noon today (11 hours ago). Pt also c/o a slight headache started at 1400 (9 hours ago) that is now resolved. Pt states that he has some difficulty speaking and difficulty swallowing but no difficulty finding his words. Pt's wife states that pt has been having less control of his L hand movements today and is dropping things. Pt's wife states that pt had 5 or 6 strokes in early 2016 and had some residue effects such as hearing loss and gait problems. Pt's wife reports that pt usually walks well at home with a walker. Pt is on ASA. Pt denies any chest pain, SOB, abdominal pain, nausea, vomiting, and urinary symptoms. There are no other acute medical concerns at this time. PCP: Yanick Taylor MD    Note written by Eduardo Gregory, as dictated by Juan Lane MD 10:47 PM        The history is provided by the patient and the spouse. No  was used. Past Medical History:   Diagnosis Date    [address] [address]      type 2    Hypertension     MI, old     Stroke [facility])        Past Surgical History:   Procedure Laterality Date    Hx appendectomy      Hx hernia repair           History reviewed. No pertinent family history. Social History     Social History    Marital status:      Spouse name: N/A    Number of children: N/A    Years of education: N/A     Occupational History    Not on file.      Social History Main Topics    Smoking status: Former Smoker    Smokeless tobacco: Never Used    Alcohol use No    Drug use: No    Sexual activity: Not on file     Other Topics Concern    Not on file     Social History Narrative         ALLERGIES: Review of patient's allergies indicates no known allergies. Review of Systems   HENT: Positive for trouble swallowing. Respiratory: Negative for chest tightness and shortness of breath. Cardiovascular: Negative for chest pain. Gastrointestinal: Negative for abdominal pain, nausea and vomiting. Genitourinary: Negative for difficulty urinating, dysuria, frequency and hematuria. Musculoskeletal: Positive for gait problem. Neurological: Positive for dizziness, facial asymmetry, speech difficulty, weakness and headaches. Psychiatric/Behavioral: Positive for confusion. All other systems reviewed and are negative. Vitals:    02/02/17 2224   BP: (!) 161/92   Pulse: 76   Resp: 16   Temp: 98.4 °F (36.9 °C)   SpO2: 97%   Weight: 104.1 kg (229 lb 6.4 oz)   Height: 6' (1.829 m)            Physical Exam   Constitutional: He is oriented to person, place, and time. He appears well-developed and well-nourished. HENT:   Head: Normocephalic and atraumatic. Mouth/Throat: Oropharynx is clear and moist.   Left sided facial droop   Eyes: Conjunctivae are normal.   Neck: Normal range of motion. Neck supple. Cardiovascular: Normal rate, regular rhythm and normal heart sounds. Pulmonary/Chest: Effort normal and breath sounds normal.   Abdominal: Soft. Bowel sounds are normal. There is no tenderness. Musculoskeletal: He exhibits no edema or tenderness. Mild weakness in left arm and leg. Slight difficulty with heel shin, finger to nose on left side. Neurological: He is alert and oriented to person, place, and time. Skin: Skin is warm and dry. Psychiatric: He has a normal mood and affect. His behavior is normal.   Nursing note and vitals reviewed. Note written by Eduardo Rivera, as dictated by Skye Mahoney MD 10:47 PM        [facility]  ED Course       Procedures    CONSULT NOTE:  12:46 AM Skye Mahoney MD spoke with Dr. Beatris Serrano, Consult for Hospitalist.  Discussed available diagnostic tests and clinical findings.   He is in agreement with care plans as outlined. Dr. Samuel Barrera recommends admitting the patient. 12:46 AM  Patient is being admitted to the hospital.  The results of their tests and reasons for their admission have been discussed with them and/or available family. They convey agreement and understanding for the need to be admitted and for their admission diagnosis. Consultation will be made now with the inpatient physician for hospitalization. ED EKG interpretation:  Rhythm: normal sinus rhythm; and regular . Rate (approx.): 60; Axis: normal; P wave: normal; QRS interval: normal ; ST/T wave: non-specific changes; This EKG was interpreted by Robina Montano MD,ED Provider. A/P:  1. CVA   2. L sided weakness.

## 2017-02-03 NOTE — ED NOTES
Pt resting in bed with eyes closed. Skin warm and dry. Respirations even and unlabored. Wife updated on plan of care, pt awaiting bed placement. Wife going home now.

## 2017-02-03 NOTE — DIABETES MGMT
DTC Consult Note    Recommendations/ Comments: Met with patient and wife. Patient with 6.4% A1C indicating good control prior to admit. Patient and wife appear to be knowledgeable about medication, monitoring and meal planning. Consult received for:  [x]             Assessment of home management    [facility] Patient is a 74 y.o. male with a documented history of Type 2 Diabetes on oral agent (monotherapy): metformin (generic) at home. BG monitoring at home 2 times per week. Patient reports BG levels at home trend: stable. Assessed and instructed patient on the following:   ·  interpretation of lab results, blood sugar goals, complications of diabetes mellitus and nutrition    Provided patient with the following: [x]             Survival skills education materials               [x]             Outpatient DTC contact number                  Discussed with patient and/or family need for follow up appointment for diabetes management after discharge. A1c:   Lab Results   Component Value Date/Time    Hemoglobin A1c 6.4 02/03/2017 04:01 AM       Recent Glucose Results:   Lab Results   Component Value Date/Time     (H) 02/03/2017 04:01 AM     (H) 02/02/2017 10:53 PM    GLUCPOC 143 (H) 02/03/2017 07:02 AM    GLUCPOC 218 (H) 02/02/2017 10:26 PM        Lab Results   Component Value Date/Time    Creatinine 0.70 02/03/2017 04:01 AM       Active Orders   Diet    DIET CARDIAC Soft Solids; Consistent Carb 1800kcal        PO intake: No data found. Current hospital DM medication: Humalog for correction    Will continue to follow as needed. Thank you.     Rachel Betancourt, MS, RN, CDE

## 2017-02-03 NOTE — PROGRESS NOTES
Physical Therapy  2/3/17    Order received. Patient chart reviewed. Attempting to see patient, however currently transferring off the floor for MRI. Will follow up if able when patient returns if appropriate. Of note- patient able to ambulate with PCT from bed to w/c in room. Gladys Sauceda, PT, DPT

## 2017-02-03 NOTE — ROUTINE PROCESS
Report called to [facility], RN. Floor is ready to receive pt. Pt remains awake and alert, skin warm and dry. Respirations even and unlabored. Pt used urinal at bedside. Pt transferred to floor via stretcher on cardiopulmonary monitoring by this RN.

## 2017-02-03 NOTE — PROGRESS NOTES
Admission Medication Reconciliation:    Information obtained from: [facility] (17) / Patient's Wife    Chief Complaint for this Admission:  AMS, Extremity Weakness    Allergies:  Review of patient's allergies indicates no known allergies. Comments/Recommendations: Discussed with patient and wife about PTA medications. 1) Modified ASA dose to 325 (from 81) daily - Patient's wife stated that he has been increased to regular strength for his heart health  2) Modified Atorvastatin dose - Patient has been instructed to take half a tablet (20mg) of Atorvastatin 40mg tablet daily (was listed as Atorvastatin 20mg daily)  3) Modified Metoprolol dose - Patient has been instructed to take half a tablet (25mg) of Metoprolol Tartrate 50mg daily (was listed as Metoprolol Tartrate 25mg BID)  4) Reports that he rarely uses Acetaminophen  5) He has not needed to take his Nitroglycerin  6) He has taken his chronic medications yesterday    Prior to Admission Medications:   Prior to Admission Medications   Prescriptions Last Dose Informant Patient Reported? Taking?   acetaminophen (TYLENOL) 325 mg tablet 2017  Yes Yes   Sig: Take 2 Tabs by mouth every six (6) hours as needed. aspirin delayed-release 325 mg tablet 2017  Yes Yes   Sig: Take 325 mg by mouth daily. atorvastatin (LIPITOR) 40 mg tablet 2017  Yes Yes   Sig: Take 20 mg by mouth daily. clopidogrel (PLAVIX) 75 mg tablet 2017  Yes Yes   Sig: Take 75 mg by mouth daily. metFORMIN (GLUCOPHAGE) 500 mg tablet 2017  Yes Yes   Sig: Take 500 mg by mouth two (2) times daily (with meals). metoprolol (LOPRESSOR) 50 mg tablet 2017  Yes Yes   Sig: Take 25 mg by mouth daily. nitroglycerin (NITROSTAT) 0.4 mg SL tablet   Yes Yes   Si.4 mg by SubLINGual route every five (5) minutes as needed for Chest Pain.       Facility-Administered Medications: None     Jeni Thompson  PharmD Candidate   [facility]

## 2017-02-04 VITALS
RESPIRATION RATE: 12 BRPM | TEMPERATURE: 98 F | HEIGHT: 72 IN | BODY MASS INDEX: 29.26 KG/M2 | WEIGHT: 216 LBS | HEART RATE: 70 BPM | OXYGEN SATURATION: 96 % | SYSTOLIC BLOOD PRESSURE: 110 MMHG | DIASTOLIC BLOOD PRESSURE: 61 MMHG

## 2017-02-04 LAB
GLUCOSE BLD STRIP.AUTO-MCNC: 120 MG/DL (ref 65–100)
GLUCOSE BLD STRIP.AUTO-MCNC: 186 MG/DL (ref 65–100)
GLUCOSE BLD STRIP.AUTO-MCNC: 202 MG/DL (ref 65–100)
SERVICE CMNT-IMP: ABNORMAL

## 2017-02-04 PROCEDURE — A9270 NON-COVERED ITEM OR SERVICE: HCPCS | Performed by: FAMILY MEDICINE

## 2017-02-04 PROCEDURE — 74011636637 HC RX REV CODE- 636/637: Performed by: FAMILY MEDICINE

## 2017-02-04 PROCEDURE — 99218 HC RM OBSERVATION: CPT

## 2017-02-04 PROCEDURE — 82962 GLUCOSE BLOOD TEST: CPT

## 2017-02-04 RX ADMIN — Medication 10 ML: at 06:46

## 2017-02-04 RX ADMIN — INSULIN LISPRO 2 UNITS: 100 INJECTION, SOLUTION INTRAVENOUS; SUBCUTANEOUS at 12:36

## 2017-02-04 NOTE — PROGRESS NOTES
Pt not on meds here     FOR D/C asa 81 vs 325 as Pt on 325 as OP , awaiting clarification from neuro prior to d.c

## 2017-02-04 NOTE — DISCHARGE INSTRUCTIONS
Discharge Instructions       PATIENT ID: Luis Perea  MRN: [id number]   YOB: 1950    DATE OF ADMISSION: 2/2/2017 10:27 PM    DATE OF DISCHARGE: 2/4/2017    PRIMARY CARE PROVIDER: Alix Newby MD     ATTENDING PHYSICIAN: Camila Jarquin*  DISCHARGING PROVIDER: Camila Jarquin MD    To contact this individual call [phone] and ask the  to page. If unavailable ask to be transferred the Adult Hospitalist Department. DISCHARGE DIAGNOSES stroke     CONSULTATIONS: IP CONSULT TO HOSPITALIST  IP CONSULT TO NEUROLOGY    PROCEDURES/SURGERIES: * No surgery found *    PENDING TEST RESULTS:   At the time of discharge the following test results are still pending:     FOLLOW UP APPOINTMENTS:   Follow-up Information     Follow up With Details Comments Toni Cardoso MD In 1 week  [address] [place] [address]      Daly Watkins MD In 1 month [place] [id number]  [phone]             ADDITIONAL CARE RECOMMENDATIONS:     DIET: Cardiac Diet      ACTIVITY: Activity as tolerated    WOUND CARE: none    EQUIPMENT needed: none      DISCHARGE MEDICATIONS:   See Medication Reconciliation Form    · It is important that you take the medication exactly as they are prescribed. · Keep your medication in the bottles provided by the pharmacist and keep a list of the medication names, dosages, and times to be taken in your wallet. · Do not take other medications without consulting your doctor. NOTIFY YOUR PHYSICIAN FOR ANY OF THE FOLLOWING:   Fever over 101 degrees for 24 hours. Chest pain, shortness of breath, fever, chills, nausea, vomiting, diarrhea, change in mentation, falling, weakness, bleeding. Severe pain or pain not relieved by medications. Or, any other signs or symptoms that you may have questions about.       DISPOSITION:  x  Home With:   OT  PT  [facility]  RN       SNF/Inpatient Rehab/LTAC Independent/assisted living    Hospice    Other:         Signed:   Celia Huynh MD  2/4/2017  10:46 AM

## 2017-02-04 NOTE — PROGRESS NOTES
Assumed care of patient at this time. Resting comfortably in bed. No complaints of pain at this time.

## 2017-02-04 NOTE — PROGRESS NOTES
CM consult noted for home health PT. CM spoke to patient for choice of agency; patient has no preference. Referral sent to [address] Referral sent through [address] [facility]. Awaiting acceptance confirmation. Spoke to [employer] for [facility] [place]; accepting this case pending PCP approval on Monday.

## 2017-02-04 NOTE — PROGRESS NOTES
Bedside and Verbal shift change report given to Kota Ibrahim RN (oncoming nurse) by Marce Lizarraga (offgoing nurse). Report included the following information SBAR, Kardex, Intake/Output, MAR and Recent Results.

## 2017-02-04 NOTE — DISCHARGE SUMMARY
Discharge Summary       PATIENT ID: Terrance Wallis  MRN: [id number]   YOB: 1950    DATE OF ADMISSION: 2/2/2017 10:27 PM    DATE OF DISCHARGE: 2/4/2017  PRIMARY CARE PROVIDER: Kyle Mccormack MD     ATTENDING PHYSICIAN:   DISCHARGING PROVIDER: Freida Kyle MD    To contact this individual call [phone] and ask the  to page. If unavailable ask to be transferred the Adult Hospitalist Department. CONSULTATIONS: IP CONSULT TO HOSPITALIST  IP CONSULT TO NEUROLOGY    PROCEDURES/SURGERIES: * No surgery found *    ADMITTING DIAGNOSES & HOSPITAL COURSE:   A 53-year-old    male with past medical history of type 2 diabetes mellitus, stroke and   myocardial infarction, hypertension, presented to the emergency   department from home with reported chief complaint of dizziness. At   current, the patient is a limited historian. Majority of history had been   obtained from review of ED and medical reports. Per the ER, initial   reports, the patient had dizziness, left-sided facial tremors, confusion,   balance problems, tongue swelling, difficulty speaking and difficulty   swallowing which started yesterday. The patient does not provide   much in terms of further details regarding the same. He also reportedly   had a headache starting yesterday afternoon. He notes the symptoms   have resolved. He notes that his tongue was swollen, but he was   \"eating. \" He denies taking any new medications, new products or   eating any new foods. The ER also noted the patient's wife noted the   patient had less controlled of his hand movements. Reportedly, he has   had 5-6 strokes in 2016 with some residual hearing loss and gait   abnormality. He reportedly, however, has been able to ambulate using   a walker.  There is no reports of syncope, loss of consciousness, neck   pain, back pain, chest pain, shortness of breath, cough, congestion,   abdominal pain, nausea, vomiting, diarrhea, melena, dysuria,   hematuria, calf pain, swelling, edema, fever, chills, cough or falls. On   arrival in the emergency department, initial recorded vital signs were   blood pressure 161/92, heart rate 76, respiratory rate 16, saturation   97% on room air. CT of the head without contrast showed no acute   intracranial abnormalities with evolution of chronic left cerebellar infarct   since last year. The patient is now seen for admission to the hospitalist   service for continued evaluation and treatment.         DISCHARGE DIAGNOSES / PLAN:      R/o CVA   Ct MRI with no new CVA  - CTA no stenosis   - cont ASA, plavix and statin   - LDL @ 48    BP stable     6. Type 2 diabetes mellitus. A1C 6.4  -0 op fup     7. Tremors, currently resolved. PENDING TEST RESULTS:   At the time of discharge the following test results are still pending:     FOLLOW UP APPOINTMENTS:    Follow-up Information     Follow up With Details Comments Toni Cardoso MD In 1 week  [address] [facility]  [address] [place] [address]      Sim Field MD In 1 month [address] [place] [phone]             ADDITIONAL CARE RECOMMENDATIONS:     DIET: Cardiac Diet      ACTIVITY: PT/OT Eval and Treat and PT/OT per [address]: none    EQUIPMENT needed: none      DISCHARGE MEDICATIONS:  Current Discharge Medication List      CONTINUE these medications which have NOT CHANGED    Details   aspirin delayed-release 325 mg tablet Take 325 mg by mouth daily. atorvastatin (LIPITOR) 40 mg tablet Take 20 mg by mouth daily. nitroglycerin (NITROSTAT) 0.4 mg SL tablet 0.4 mg by SubLINGual route every five (5) minutes as needed for Chest Pain.      metFORMIN (GLUCOPHAGE) 500 mg tablet Take 500 mg by mouth two (2) times daily (with meals). clopidogrel (PLAVIX) 75 mg tablet Take 75 mg by mouth daily.       acetaminophen (TYLENOL) 325 mg tablet Take 2 Tabs by mouth every six (6) hours as needed. metoprolol (LOPRESSOR) 50 mg tablet Take 25 mg by mouth daily. NOTIFY YOUR PHYSICIAN FOR ANY OF THE FOLLOWING:   Fever over 101 degrees for 24 hours. Chest pain, shortness of breath, fever, chills, nausea, vomiting, diarrhea, change in mentation, falling, weakness, bleeding. Severe pain or pain not relieved by medications. Or, any other signs or symptoms that you may have questions about. DISPOSITION:  x  Home With:  x OT  PT  HH  RN       Long term SNF/Inpatient Rehab    Independent/assisted living    Hospice    Other:       PATIENT CONDITION AT DISCHARGE:     Functional status    Poor    x Deconditioned     Independent      Cognition   x  Lucid     Forgetful     Dementia      Catheters/lines (plus indication)    Boss     PICC     PEG    x None      Code status   x  Full code     DNR      PHYSICAL EXAMINATION AT DISCHARGE:   VITAL SIGNS: Temperature is 98.4 degrees Fahrenheit, blood   pressure 142/78, heart rate 60, respiratory rate 14, O2 saturation 99%   on room air. Recorded weight is 222 pounds (100.7 kg). Recorded   height 6 foot 0 inches tall. GENERAL: Overweight male in no acute respiratory distress. PSYCHIATRIC: The patient is awake, alert, oriented x3. NEUROLOGIC: Best response. Moves extremities x4. Sensation is   grossly intact without slurred speech, facial droop, no pronator drift. HEENT: Normocephalic, atraumatic. PERRLA is intact. Sclerae are   anicteric. Conjunctivae clear. Nares are patent. Oropharynx clear. Tongue is midline, nonedematous. NECK: Supple, without lymphadenopathy, JVD, carotid bruits, no   thyromegaly. Nontender, full range of motion. No acute palpable   cervical deformity. No stridor. LYMPH: Negative for cervical, supraclavicular adenopathy. RESPIRATORY: Lungs are clear to auscultation bilaterally. CARDIOVASCULAR: Heart regular rate and rhythm, normal S1, S2,   no murmurs, rubs or gallops.    GI: Abdomen soft, nontender, nondistended. Normoactive bowel   sounds. No guarding or rigidity. No auscultated abdominal sounds. No   palpable abdominal mass. BACK: No CVA tenderness. No step-off deformity. MUSCULOSKELETAL: Full range of motion. Negative for calf   tenderness. VASCULAR: 2+ radial, 1+ dorsalis pedis pulses, without cyanosis. Positive for clubbing and negative for edema. SKIN: Warm and dry.       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 ([facility] ICD-10-CM: V72.603, G25.32  ICD-9-CM: 434.91  2/26/2016 - Present        CAD (coronary artery disease) (Chronic) ICD-10-CM: I25.10  ICD-9-CM: 414.00  2/9/2016 - Present        Hypertension (Chronic) ICD-10-CM: I10  ICD-9-CM: 401.9  2/9/2016 - Present        Type II diabetes mellitus ([facility] (Chronic) ICD-10-CM: E11.9  ICD-9-CM: 250.00  2/9/2016 - Present        Embolism of left vertebral artery ICD-10-CM: I65.02  ICD-9-CM: 433.20  2/9/2016 - Present        Vertigo ICD-10-CM: R42  ICD-9-CM: 780.4  2/9/2016 - Present              Greater than 30minutes were spent with the patient on counseling and coordination of care    Signed:   Carlie Gonzalez MD  2/4/2017  10:47 AM

## 2017-02-04 NOTE — PROGRESS NOTES
1930: Bedside and Verbal shift change report given to Janine (oncoming nurse) by Karely (offgoing nurse). Report included the following information SBAR, Kardex, ED Summary, Intake/Output, MAR and Recent Results. 2300: TRANSFER - OUT REPORT:  Verbal report given to (name) rivera Moseley  being transferred to NSTU(unit) for routine progression of care     Report consisted of patients Situation, Background, Assessment and   Recommendations(SBAR). Information from the following report(s) SBAR, Kardex, ED Summary, Intake/Output, MAR and Recent Results was reviewed with the receiving nurse. Lines:   Peripheral IV 02/02/17 Left Forearm (Active)   Site Assessment Clean, dry, & intact 2/3/2017  8:00 PM   Phlebitis Assessment 0 2/3/2017  8:00 PM   Infiltration Assessment 0 2/3/2017  8:00 PM   Dressing Status Clean, dry, & intact 2/3/2017  8:00 PM   Dressing Type Tape;Transparent 2/3/2017  8:00 PM   Hub Color/Line Status Pink;Capped;Flushed 2/3/2017  8:00 PM   Action Taken Open ports on tubing capped 2/3/2017  8:00 PM   Alcohol Cap Used Yes 2/3/2017  8:00 PM        Opportunity for questions and clarification was provided.       Patient transported with:   Monitor  Registered Nurse  Tech

## 2017-02-04 NOTE — PROGRESS NOTES
TRANSFER - IN REPORT:    Verbal report received from [facility], [address] [place] [address] (name) on Mikie Mystics  being received from ICU (unit) for routine progression of care      Report consisted of patients Situation, Background, Assessment and   Recommendations(SBAR). Information from the following report(s) SBAR, Kardex, Intake/Output, MAR, Recent Results and Cardiac Rhythm NSR  was reviewed with the receiving nurse. Opportunity for questions and clarification was provided. Assessment completed upon patients arrival to unit and care assumed.

## 2017-02-09 ENCOUNTER — HOME HEALTH ADMISSION (OUTPATIENT)
Dept: HOME HEALTH SERVICES | Facility: HOME HEALTH | Age: 67
End: 2017-02-09

## 2017-09-29 ENCOUNTER — OFFICE VISIT (OUTPATIENT)
Dept: CARDIOLOGY CLINIC | Age: 67
End: 2017-09-29

## 2017-09-29 VITALS
OXYGEN SATURATION: 96 % | BODY MASS INDEX: 30.34 KG/M2 | DIASTOLIC BLOOD PRESSURE: 78 MMHG | HEIGHT: 72 IN | HEART RATE: 64 BPM | WEIGHT: 224 LBS | SYSTOLIC BLOOD PRESSURE: 140 MMHG

## 2017-09-29 DIAGNOSIS — I25.119 CORONARY ARTERY DISEASE WITH ANGINA PECTORIS, UNSPECIFIED VESSEL OR LESION TYPE, UNSPECIFIED WHETHER NATIVE OR TRANSPLANTED HEART (HCC): Chronic | ICD-10-CM

## 2017-09-29 DIAGNOSIS — I63.22 ACUTE ISCHEMIC VERTEBROBASILAR ARTERY BRAINSTEM STROKE INVOLVING LEFT-SIDED VESSEL (HCC): Primary | ICD-10-CM

## 2017-09-29 DIAGNOSIS — I63.212 ACUTE ISCHEMIC VERTEBROBASILAR ARTERY BRAINSTEM STROKE INVOLVING LEFT-SIDED VESSEL (HCC): Primary | ICD-10-CM

## 2017-09-29 DIAGNOSIS — I10 ESSENTIAL HYPERTENSION: Chronic | ICD-10-CM

## 2017-09-29 NOTE — MR AVS SNAPSHOT
Visit Information Date & Time Provider Department Dept. Phone Encounter #  
 9/29/2017  1:00 PM Tomás Swann MD [facility] [address] [id number] Upcoming Health Maintenance Date Due Hepatitis C Screening 1950 FOOT EXAM Q1 2/25/1958 MICROALBUMIN Q1 2/25/1958 EYE EXAM RETINAL OR DILATED Q1 2/25/1958 DTaP/Tdap/Td series (1 - Tdap) 2/25/1969 FOBT Q 1 YEAR AGE 50-75 2/25/1998 ZOSTER VACCINE AGE 60> 12/25/2007 GLAUCOMA SCREENING Q2Y 2/25/2013 Pneumococcal 65+ Low/Medium Risk (1 of 2 - PCV13) 2/25/2013 MEDICARE YEARLY EXAM 2/25/2013 INFLUENZA AGE 9 TO ADULT 8/1/2017 HEMOGLOBIN A1C Q6M 8/3/2017 LIPID PANEL Q1 2/3/2018 Allergies as of 9/29/2017  Review Complete On: 2/3/2017 By: Angel Valdes No Known Allergies Current Immunizations  Reviewed on 2/29/2016 No immunizations on file. Not reviewed this visit You Were Diagnosed With   
  
 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 
ICD-9-CM: 434.91 Essential hypertension     ICD-10-CM: I10 
ICD-9-CM: 401.9 Coronary artery disease with angina pectoris, unspecified vessel or lesion type, unspecified whether native or transplanted heart [facility])     ICD-10-CM: I25.119 ICD-9-CM: 414.00, 413.9 Vitals BP Pulse Height(growth percentile) Weight(growth percentile) SpO2 BMI  
 140/78 64 6' (1.829 m) 224 lb (101.6 kg) 96% 30.38 kg/m2 Smoking Status Former Smoker Vitals History BMI and BSA Data Body Mass Index Body Surface Area  
 30.38 kg/m 2 2.27 m 2 Your Updated Medication List  
  
   
This list is accurate as of: 9/29/17  1:51 PM.  Always use your most recent med list.  
  
  
  
  
 acetaminophen 325 mg tablet Commonly known as:  TYLENOL Take 2 Tabs by mouth every six (6) hours as needed. aspirin delayed-release 325 mg tablet Take 325 mg by mouth daily. atorvastatin 40 mg tablet Commonly known as:  LIPITOR Take 20 mg by mouth daily. clopidogrel 75 mg Tab Commonly known as:  PLAVIX Take 75 mg by mouth daily. metFORMIN 500 mg tablet Commonly known as:  GLUCOPHAGE Take 500 mg by mouth two (2) times daily (with meals). metoprolol tartrate 50 mg tablet Commonly known as:  LOPRESSOR Take 25 mg by mouth daily. NITROSTAT 0.4 mg SL tablet Generic drug:  nitroglycerin 0.4 mg by SubLINGual route every five (5) minutes as needed for Chest Pain. We Performed the Following AMB POC EKG ROUTINE W/ 12 LEADS, INTER & REP [19667 CPT(R)] To-Do List   
 09/29/2017 ECHO:  ECHO TTE STRESS EXERCISE TREADMILL COMP   
  
 09/29/2017 ECHO:  ECHO TTE STRESS EXRCSE COMP W OR WO CONTR   
  
 10/18/2017 10:00 AM  
  Appointment with [facility] [address] LAB RM 1; [facility] STRESS LAB at [facility] NON-INVASIVE CARD ([phone]) Age Limit for ALL Heart procedures @ all [facility] facilities: 18 yrs and older only. Under the age of 25 suggest referring to [facility] ([phone]). PATIENTS SHOULD NOT BRING CHILDREN UNATTENDED TO APPTS. Weight Limit:  350 lbs  1-No eating or coffee after midnight 2-Do not take Beta Blocker or Calcium Channel blocker the day of the test unless specified by cardiologist. Please bring with. 3-Do not take diabetic meds day of test (bring with) 4-Patient will be walking/running on a Treadmill. Patient should wear comfortable shoes that are suitable for walking (NO Sandals/Flip Flops, High Heels, etc.) & comfortable clothing. Introducing [facility]! [facility] introduces [facility] patient portal. Now you can access parts of your medical record, email your doctor's office, and request medication refills online. 1. In your internet browser, go to [email or web address] 2. Click on the First Time User? Click Here link in the Sign In box. You will see the New Member Sign Up page. 3. Enter your [employer] Access Code exactly as it appears below. You will not need to use this code after youve completed the sign-up process. If you do not sign up before the expiration date, you must request a new code. · [employer] Access Code: [email or web address] Expires: 12/28/2017 12:57 PM 
 
4. Enter the last four digits of your Social Security Number (xxxx) and Date of Birth (mm/dd/yyyy) as indicated and click Submit. You will be taken to the next sign-up page. 5. Create a [employer] ID. This will be your [employer] login ID and cannot be changed, so think of one that is secure and easy to remember. 6. Create a [employer] password. You can change your password at any time. 7. Enter your Password Reset Question and Answer. This can be used at a later time if you forget your password. 8. Enter your e-mail address. You will receive e-mail notification when new information is available in [address] 9. Click Sign Up. You can now view and download portions of your medical record. 10. Click the Download Summary menu link to download a portable copy of your medical information. If you have questions, please visit the Frequently Asked Questions section of the [employer] website. Remember, [employer] is NOT to be used for urgent needs. For medical emergencies, dial 911. Now available from your iPhone and Android! Please provide this summary of care documentation to your next provider. Your primary care clinician is listed as [address]. If you have any questions after today's visit, please call [phone].

## 2017-09-29 NOTE — PROGRESS NOTES
1. Have you been to the ER, urgent care clinic since your last visit? Hospitalized since your last visit? No     2. Have you seen or consulted any other health care providers outside of the [address] [place] [address] since your last visit? Include any pap smears or colon screening.  No

## 2017-10-06 NOTE — PROGRESS NOTES
PATIENT NAME: Pam Humphrey         69 y.o.      1950              DATE:9/29/2017    REASON FOR VISIT: Coronary artery disease    HISTORY OF PRESENT ILLNESS: Referred by the [employer] because of a history of a prior myocardial infarction. It is unclear from the record as to when this actually occurred and as to what was involved. The patient is a hypertensive diabetic with a history of hyperlipidemia. He is status post right hemispheric CVA with left hemiparesis. He has mild left-sided weakness but states that he can walk on a treadmill. His main complaint is lightheadedness. He has not been syncopal.  He does experience some shortness of breath on exertion. Denies chest pain. Denies palpitation. PAST MEDICAL HISTORY:   Past Medical History:  No date: Diabetes ([facility] [address]      Comment: type 2  No date: Hypertension  No date: MI, old  No date: Stroke [facility])    PAST SURGICAL HISTORY:   Past Surgical History:  No date: HX APPENDECTOMY  No date: HX HERNIA REPAIR      SOCIAL HISTORY:  Social History    Marital status:              Spouse name:                       Years of education:                 Number of children:               Social History Main Topics    Smoking status: Former Smoker                                                                Packs/day: 0.00      Years: 0.00        Smokeless status: Never Used                        Alcohol use: No              Drug use: No                ALLERGIES:   No Known Allergies     CURRENT MEDICATIONS:   Current Outpatient Prescriptions:  aspirin delayed-release 325 mg tablet, Take 325 mg by mouth daily. atorvastatin (LIPITOR) 40 mg tablet, Take 20 mg by mouth daily. nitroglycerin (NITROSTAT) 0.4 mg SL tablet, 0.4 mg by SubLINGual route every five (5) minutes as needed for Chest Pain.  metFORMIN (GLUCOPHAGE) 500 mg tablet, Take 500 mg by mouth two (2) times daily (with meals).   clopidogrel (PLAVIX) 75 mg tablet, Take 75 mg by mouth daily. acetaminophen (TYLENOL) 325 mg tablet, Take 2 Tabs by mouth every six (6) hours as needed. metoprolol (LOPRESSOR) 50 mg tablet, Take 25 mg by mouth daily. No current facility-administered medications for this visit. REVIEW of SYSTEMS:History obtained from chart review and the patient  General ROS: negative for - weight gain or weight loss  Hematological and Lymphatic ROS: negative for - bleeding problems  Respiratory ROS: See history of present illness  Cardiovascular ROS: See history of present illness  Neurological ROS: Post right hemispheric CVA. Mild residual left-sided weakness. PHYSICAL EXAMINATION:   /78  Pulse 64  Ht 6' (1.829 m)  Wt 101.6 kg (224 lb)  SpO2 96%  BMI 30.38 kg/m2  BP Readings from Last 3 Encounters:  09/29/17 : 140/78  02/04/17 : 110/61  03/01/16 : 131/87    Pulse Readings from Last 3 Encounters:  09/29/17 : 64  02/04/17 : 70  03/01/16 : 70    Wt Readings from Last 3 Encounters:  09/29/17 : 101.6 kg (224 lb)  02/04/17 : 98 kg (216 lb)  03/01/16 : 97.3 kg (214 lb 9.6 oz)    General: Obese male in no apparent distress. HEENT: Sclera clear. Mucous membranes pink and moist.  Neck: No jugular venous distention. Carotid upstrokes 2+ without bruits. Chest: Clear to  auscultation. Heart: PMI not palpable. Regular rhythm. No murmur or gallop. Abdomen: Nontender without masses or organomegaly. Extremities: No edema. Skin: Warm and dry. No stasis changes. Neuro: Alert, oriented, speech WNL, no facial asymmetry. Gait WNL. EKG: Abnormal ST-T changes inferior and anterolateral leads. ST depressions with T-wave inversions. Possible ischemia. IMPRESSION:   Coronary artery disease, status post myocardial infarction  Abnormal EKG consistent with myocardial ischemia  Exertional shortness of breath.   Possible anginal equivalent  Lightheadedness unknown etiology  Hypertension  Diabetes  Hyperlipidemia    PLAN:  Patient states that he would be able to walk on a treadmill. Schedule stress echocardiography. He should have ambulatory monitoring for the lightheadedness. Schedule cardiac event recorder    The diagnoses and plan were discussed with patient. All questions answered. Plan of care agreed to by all concerned. [place].  MD Adolph       ,

## 2017-10-12 ENCOUNTER — TELEPHONE (OUTPATIENT)
Dept: CARDIOLOGY CLINIC | Age: 67
End: 2017-10-12

## 2017-10-12 NOTE — TELEPHONE ENCOUNTER
----- Message from Ariela Mccabe MD sent at 10/6/2017  2:10 PM EDT -----  This patient needs a cardiac event recorder to evaluate his lightheadedness. He should follow-up in the office after the event recorder.

## 2017-10-18 ENCOUNTER — HOSPITAL ENCOUNTER (OUTPATIENT)
Dept: NON INVASIVE DIAGNOSTICS | Age: 67
Discharge: HOME | End: 2017-10-18

## 2017-10-18 DIAGNOSIS — I25.119 CORONARY ARTERY DISEASE WITH ANGINA PECTORIS, UNSPECIFIED VESSEL OR LESION TYPE, UNSPECIFIED WHETHER NATIVE OR TRANSPLANTED HEART (HCC): Chronic | ICD-10-CM

## 2017-10-18 NOTE — PROGRESS NOTES
The patient arrived and he was using a walker to get from one room to another. He states that he uses the furniture and walls at home. When he goes out he either uses the walker or a cane. He states that he is unsteady, particularly on his left side, but that he can walk a treadmill. I called the PA and she decided to call the office and perhaps get another kind of stress ordered and the patient agreed that this would be best for him. Stress Echo was cancelled per PA.

## 2017-10-23 ENCOUNTER — TELEPHONE (OUTPATIENT)
Dept: CARDIOLOGY CLINIC | Age: 67
End: 2017-10-23

## 2017-10-23 DIAGNOSIS — I25.119 CORONARY ARTERY DISEASE WITH ANGINA PECTORIS, UNSPECIFIED VESSEL OR LESION TYPE, UNSPECIFIED WHETHER NATIVE OR TRANSPLANTED HEART (HCC): Primary | Chronic | ICD-10-CM

## 2017-10-23 DIAGNOSIS — R94.31 ABNORMAL EKG: ICD-10-CM

## 2018-01-17 ENCOUNTER — HOSPITAL ENCOUNTER (OUTPATIENT)
Dept: NON INVASIVE DIAGNOSTICS | Age: 68
Discharge: HOME | End: 2018-01-17
Payer: MEDICARE

## 2018-01-17 DIAGNOSIS — R06.02 SOB (SHORTNESS OF BREATH): ICD-10-CM

## 2018-01-17 LAB
ATTENDING PHYSICIAN, CST07: NORMAL
DIAGNOSIS, 93000: NORMAL
DUKE TM SCORE RESULT, CST14: NORMAL
DUKE TREADMILL SCORE, CST13: NORMAL
ECG INTERP BEFORE EX, CST11: NORMAL
ECG INTERP DURING EX, CST12: NORMAL
FUNCTIONAL CAPACITY, CST17: NORMAL
KNOWN CARDIAC CONDITION, CST08: NORMAL
MAX. DIASTOLIC BP, CST04: 81 MMHG
MAX. HEART RATE, CST05: 82 BPM
MAX. SYSTOLIC BP, CST03: 146 MMHG
OVERALL BP RESPONSE TO EXERCISE, CST16: NORMAL
OVERALL HR RESPONSE TO EXERCISE, CST15: NORMAL
PEAK EX METS, CST10: 1 METS
PROTOCOL NAME, CST01: NORMAL
TEST INDICATION, CST09: NORMAL

## 2018-01-17 PROCEDURE — A9500 TC99M SESTAMIBI: HCPCS

## 2018-01-17 PROCEDURE — 74011250636 HC RX REV CODE- 250/636

## 2018-01-17 PROCEDURE — 93017 CV STRESS TEST TRACING ONLY: CPT

## 2018-01-17 PROCEDURE — 78452 HT MUSCLE IMAGE SPECT MULT: CPT

## 2018-01-17 RX ORDER — SODIUM CHLORIDE 9 MG/ML
250 INJECTION, SOLUTION INTRAVENOUS ONCE
Status: COMPLETED | OUTPATIENT
Start: 2018-01-17 | End: 2018-01-17

## 2018-01-17 RX ADMIN — SODIUM CHLORIDE 250 ML: 900 INJECTION, SOLUTION INTRAVENOUS at 10:30

## 2018-01-17 RX ADMIN — REGADENOSON 0.4 MG: 0.08 INJECTION, SOLUTION INTRAVENOUS at 10:40

## 2018-01-17 NOTE — PROCEDURES
[address]  MR#: [id number]  : 1950  ACCOUNT #: [de-identified]   DATE OF SERVICE: 2018    PROCEDURE PERFORMED:  Pharmacological nuclear scan. Baseline electrocardiogram shows sinus rhythm with T-wave inversion noted inferolaterally most notably in II, III, aVF, V3 through V6. Patient has an IV in the left arm. He received Lexiscan per protocol. He tolerated the procedure well. No chest pain was noted. He received resting dose of sestamibi 10.2 mCi at 9:40 a.m. He received stress dose of sestamibi 33.0 mCi at 10:50 a.m. TREADMILL FINDINGS:  1.  ST segment changes:  None from baseline. 2.  Dysrhythmia:  None. 3.  Chest pain:  None. Both heart rate and blood pressure response are normal.    TREADMILL CONCLUSION:  This is a nondiagnostic pharmacologic stress test from electrocardiographic standpoint due to underlying inferolateral T-wave inversions. MYOCARDIAL NUCLEAR PERFUSION STUDY FINDINGS:  1. Perfusion imaging shows no evidence of significant reversible defect. There is an inferobasal fixed defect without significant reversibility. This is likely consistent with either diaphragmatic attenuation or previous inferobasal injury. 2.  Gated SPECT imaging shows normal left ventricular chamber size and overall normal left ventricular systolic function with estimated ejection fraction of 56%. There is mild inferobasal hypokinesis noted. CONCLUSIONS:  1. Nondiagnostic pharmacologic stress test from electrocardiographic standpoint. 2.  Abnormal perfusion study. 3.  Perfusion imaging shows inferobasal fixed defect without significant reversible defects. 4.  Gated SPECT imaging shows normal left ventricular chamber size and function with estimated ejection fraction of 56%. There is mild inferobasal hypokinesis noted. 5.  This is an intermediate risk finding.       MD Elva Johnson  D: 2018 14:23 T: 01/17/2018 15:06  JOB #: [id number]  CC: Kristina Thompson MD  CC: Francisco Bradley MD  [address]

## 2018-01-17 NOTE — PROGRESS NOTES
Patient was given 10.23 mCi of Sestamibi for the Resting pictures. Patient received 0.4 mg of Lexiscan for the exercise portion of the Stress test. Patient was then given 33 mCi of Sestamibi for the Stress pictures. Armband was removed and disposed of before the patient left.

## 2018-01-22 NOTE — PROGRESS NOTES
Per your last note \" Verbal order and read back per Yue Dinh MD   Pharm nuc dx: SOB, and LYNCH   F/U PRN

## 2018-02-22 NOTE — PROGRESS NOTES
Done per caitlin note \"Per your last note \" Verbal order and read back per Briana Gomez MD   Pharm nuc dx: SOB, and LYNCH   F/U PRN \"    Please read and advise. Thank you.

## 2018-02-24 ENCOUNTER — TELEPHONE (OUTPATIENT)
Dept: CARDIOLOGY CLINIC | Age: 68
End: 2018-02-24

## 2018-02-24 NOTE — TELEPHONE ENCOUNTER
I called and left a message on the patient's home answering machine for the patient to call the office and speak with [facility] or [facility] with regard to the results of his nuclear myocardial perfusion study. His nuclear myocardial perfusion study basically showed a small inferior scar from an old heart attack without any ischemia or area of the heart that is not getting enough blood under stress. His overall heart function was completely normal although the area on the bottom of the heart was a little bit sluggish in movement probably related to an old small heart attack there in the past.  However the overall left ventricular function appears to be normal.  Please check with the patient to see how he is feeling but I would tell him that in general with this kind of finding we would tend to continue to treat him medically with aspirin, Plavix, beta-blockers, and statin drugs which she is already taking. Please let the patient know.  ES

## 2018-02-24 NOTE — TELEPHONE ENCOUNTER
----- Message from Dell Montoya LPN sent at 7/99/3704  1:30 PM EST -----  Done per caitlin note \"Per your last note \" Verbal order and read back per Parker Luo MD   Pharm nuc dx: SOB, and LYNCH   F/U PRN \"    Please read and advise. Thank you.

## 2018-02-26 NOTE — TELEPHONE ENCOUNTER
LMOM for patient to call back for below results.       Verbal order and read back per Ac Mendoza, DO

## 2018-05-21 ENCOUNTER — OFFICE VISIT (OUTPATIENT)
Dept: CARDIOLOGY CLINIC | Age: 68
End: 2018-05-21

## 2018-05-21 VITALS
WEIGHT: 228 LBS | BODY MASS INDEX: 30.88 KG/M2 | SYSTOLIC BLOOD PRESSURE: 140 MMHG | DIASTOLIC BLOOD PRESSURE: 88 MMHG | HEIGHT: 72 IN | OXYGEN SATURATION: 98 % | HEART RATE: 61 BPM

## 2018-05-21 DIAGNOSIS — I25.119 CORONARY ARTERY DISEASE WITH ANGINA PECTORIS, UNSPECIFIED VESSEL OR LESION TYPE, UNSPECIFIED WHETHER NATIVE OR TRANSPLANTED HEART (HCC): Primary | Chronic | ICD-10-CM

## 2018-05-21 DIAGNOSIS — E78.5 DYSLIPIDEMIA: ICD-10-CM

## 2018-05-21 DIAGNOSIS — I10 ESSENTIAL HYPERTENSION: Chronic | ICD-10-CM

## 2018-05-21 DIAGNOSIS — I69.359 CVA, OLD, HEMIPARESIS (HCC): ICD-10-CM

## 2018-05-21 DIAGNOSIS — R94.31 ABNORMAL EKG: ICD-10-CM

## 2018-05-21 RX ORDER — SILDENAFIL 100 MG/1
100 TABLET, FILM COATED ORAL AS NEEDED
Qty: 15 TAB | Refills: 2 | Status: SHIPPED | OUTPATIENT
Start: 2018-05-21

## 2018-05-21 NOTE — PROGRESS NOTES
HISTORY OF PRESENT ILLNESS  Afia Morrison is a 77 y.o. male. HPI    Patient presents for a follow-up office visit. He was previously followed by Dr. Piotr Peng. He has a past medical history significant for coronary disease, status post remote myocardial infarction. He also has a history of hypertension, dyslipidemia, and multiple recurrent strokes. His last stroke was in 2016. Due to his strokes, he has residual left-sided hemideficit is able to walk with a walker. Patient underwent a pharmacologic nuclear stress test in January 2018 which was mildly abnormal demonstrate a fixed inferior perfusion defect consistent with prior infarction. There were no reversible defects concerning for myocardial ischemia. EF 56% with inferobasilar hypokinesis. He was last seen in our office 7-8 months ago. Since that time, he reports episodic chest tightness in the middle and left side of his chest.  The symptoms can occur with either rest or exertion and also occur after eating spicy foods. The symptoms never last more than 2-3 minutes in duration. Patient feels he has been more active this year than he was last year and has not noted any worsening of his chest pain. He does have chronic dizzy spells when he stands up quickly, but no syncope or near syncope. He states he is in the process of moving to the [facility]. Past Medical History:   Diagnosis Date    Diabetes ([facility] [address]     type 2    Hypertension     MI, old     Stroke [facility])      Current Outpatient Prescriptions   Medication Sig Dispense Refill    sildenafil citrate (VIAGRA) 100 mg tablet Take 1 Tab by mouth as needed. 15 Tab 2    aspirin delayed-release 325 mg tablet Take 325 mg by mouth daily.  nitroglycerin (NITROSTAT) 0.4 mg SL tablet 0.4 mg by SubLINGual route every five (5) minutes as needed for Chest Pain.  metFORMIN (GLUCOPHAGE) 500 mg tablet Take 500 mg by mouth daily (with breakfast).       clopidogrel (PLAVIX) 75 mg tablet Take 75 mg by mouth daily.  acetaminophen (TYLENOL) 325 mg tablet Take 2 Tabs by mouth every six (6) hours as needed.  metoprolol (LOPRESSOR) 50 mg tablet Take 25 mg by mouth two (2) times a day. No Known Allergies     Social History   Substance Use Topics    Smoking status: Former Smoker    Smokeless tobacco: Never Used    Alcohol use No     History reviewed. No pertinent family history. Review of Systems   Constitutional: Negative for chills, fever and weight loss. HENT: Negative for nosebleeds. Eyes: Negative for blurred vision and double vision. Respiratory: Negative for cough, shortness of breath and wheezing. Cardiovascular: Positive for chest pain ( Atypical). Negative for palpitations, orthopnea, claudication, leg swelling and PND. Gastrointestinal: Negative for abdominal pain, heartburn, nausea and vomiting. Genitourinary: Negative for dysuria and hematuria. Musculoskeletal: Negative for falls and myalgias. Skin: Negative for rash. Neurological: Positive for focal weakness. Negative for dizziness and headaches. Endo/Heme/Allergies: Does not bruise/bleed easily. Psychiatric/Behavioral: Negative for substance abuse. Visit Vitals    /88    Pulse 61    Ht 6' (1.829 m)    Wt 103.4 kg (228 lb)    SpO2 98%    BMI 30.92 kg/m2       Physical Exam   Constitutional: He is oriented to person, place, and time. He appears well-developed and well-nourished. HENT:   Head: Normocephalic and atraumatic. Eyes: Conjunctivae are normal.   Neck: Neck supple. No JVD present. Carotid bruit is not present. Cardiovascular: Normal rate, regular rhythm, S1 normal, S2 normal and normal pulses. Exam reveals no gallop and no S3. No murmur heard. Pulmonary/Chest: Breath sounds normal. He has no wheezes. He has no rales. Abdominal: Soft. Bowel sounds are normal. There is no tenderness. Musculoskeletal: He exhibits no edema. Neurological: He is alert and oriented to person, place, and time. Skin: Skin is warm and dry. EKG: Normal sinus rhythm, leftward axis, borderline voltage criteria for LVH, diffuse ST-T abnormality inferiorly and anterolaterally. Normal Interval.  No change compared to the previous EKG. ASSESSMENT and PLAN  Encounter Diagnoses   Name Primary?  Coronary artery disease with angina pectoris, unspecified vessel or lesion type, unspecified whether native or transplanted heart ([facility] [address] Yes    Essential hypertension     Abnormal EKG     CVA, old, hemiparesis ([facility] [address]     Dyslipidemia      Coronary disease. History of prior inferior wall MI seen on recent nuclear stress test in January 2018. Patient reports this has been present on previous stress test as well. He does complain of some atypical chest pain, which does not sound anginal in nature. He remains on aspirin, clopidogrel, and metoprolol. All of which I would continue. He was previously taking atorvastatin as well, but for some reason is dropped off his medication list.  I have asked him to follow-up with his PCP to see if he is actually taking this medication. From a cardiac standpoint he should be on a potent statin. Abnormal EKG. Patient's EKG changes are unchanged today compared to last visit. He underwent a nuclear stress test in January 2018 which did not demonstrate any significant ischemia, just evidence of a fixed inferior defect. Essential hypertension. Patient blood pressure appears upper limits of normal on his current regimen which includes metoprolol. He should likely be on an ACE inhibitor or ARB as well given his history of diabetes. I will defer this to his PCP. History of recurrent CVA. Patient is now below the plate therapy and should be on a statin as well. Dyslipidemia. Patient was previously taking atorvastatin. He should be restarted on this medication. Diabetes mellitus, type II.   Medical oral agents. Followed by his PCP. From a cardiac standpoint we would prefer his hemoglobin A1c be less than 7. Since the patient is moving to [place], he can follow-up as needed.

## 2018-05-21 NOTE — MR AVS SNAPSHOT
[address] [place] [id number] 
[phone] Patient: Eliana Torres MRN: [id number] OPX:2/42/[id number] Visit Information Date & Time Provider Department Dept. Phone Encounter #  
 5/21/2018 11:20 AM Brandon Spann MD [facility] [id number] Upcoming Health Maintenance Date Due Hepatitis C Screening 1950 FOOT EXAM Q1 2/25/1958 MICROALBUMIN Q1 2/25/1958 EYE EXAM RETINAL OR DILATED Q1 2/25/1958 DTaP/Tdap/Td series (1 - Tdap) 2/25/1969 FOBT Q 1 YEAR AGE 50-75 2/25/1998 ZOSTER VACCINE AGE 60> 12/25/2007 GLAUCOMA SCREENING Q2Y 2/25/2013 Pneumococcal 65+ Low/Medium Risk (1 of 2 - PCV13) 2/25/2013 HEMOGLOBIN A1C Q6M 8/3/2017 MEDICARE YEARLY EXAM 3/14/2018 LIPID PANEL Q1 6/30/2018 Influenza Age 5 to Adult 8/1/2018 Allergies as of 5/21/2018  Review Complete On: 2/3/2017 By: Flavia Mathew No Known Allergies Current Immunizations  Reviewed on 2/29/2016 No immunizations on file. Not reviewed this visit You Were Diagnosed With   
  
 Codes Comments Coronary artery disease with angina pectoris, unspecified vessel or lesion type, unspecified whether native or transplanted heart ([facility] [address]    -  Primary ICD-10-CM: I25.119 ICD-9-CM: 414.00, 413.9 Essential hypertension     ICD-10-CM: I10 
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 
ICD-9-CM: 434.91 Vitals BP Pulse Height(growth percentile) Weight(growth percentile) SpO2 BMI  
 140/88 61 6' (1.829 m) 228 lb (103.4 kg) 98% 30.92 kg/m2 Smoking Status Former Smoker Vitals History BMI and BSA Data Body Mass Index Body Surface Area 30.92 kg/m 2 2.29 m 2 Your Updated Medication List  
  
   
 This list is accurate as of 5/21/18 12:23 PM.  Always use your most recent med list.  
  
  
  
  
 acetaminophen 325 mg tablet Commonly known as:  TYLENOL Take 2 Tabs by mouth every six (6) hours as needed. aspirin delayed-release 325 mg tablet Take 325 mg by mouth daily. clopidogrel 75 mg Tab Commonly known as:  PLAVIX Take 75 mg by mouth daily. metFORMIN 500 mg tablet Commonly known as:  GLUCOPHAGE Take 500 mg by mouth daily (with breakfast). metoprolol tartrate 50 mg tablet Commonly known as:  LOPRESSOR Take 25 mg by mouth two (2) times a day. NITROSTAT 0.4 mg SL tablet Generic drug:  nitroglycerin 0.4 mg by SubLINGual route every five (5) minutes as needed for Chest Pain.  
  
 sildenafil citrate 100 mg tablet Commonly known as:  VIAGRA Take 1 Tab by mouth as needed. Prescriptions Printed Refills  
 sildenafil citrate (VIAGRA) 100 mg tablet 2 Sig: Take 1 Tab by mouth as needed. Class: Print Route: Oral  
  
We Performed the Following AMB POC EKG ROUTINE W/ 12 LEADS, INTER & REP [38365 CPT(R)] Introducing [facility]! [facility] introduces [employer] patient portal. Now you can access parts of your medical record, email your doctor's office, and request medication refills online. 1. In your internet browser, go to [email or web address]://[employer]. [employer] 2. Click on the First Time User? Click Here link in the Sign In box. You will see the New Member Sign Up page. 3. Enter your Wavii Access Code exactly as it appears below. You will not need to use this code after youve completed the sign-up process. If you do not sign up before the expiration date, you must request a new code. · W[employer]ii Access Code: [email or web address] Expires: 8/19/2018 12:23 PM 
 
4. Enter the last four digits of your Social Security Number (xxxx) and Date of Birth (mm/dd/yyyy) as indicated and click Submit.  You will be taken to the next sign-up page. 5. Create a Stitcher ID. This will be your Stitcher login ID and cannot be changed, so think of one that is secure and easy to remember. 6. Create a Stitcher password. You can change your password at any time. 7. Enter your Password Reset Question and Answer. This can be used at a later time if you forget your password. 8. Enter your e-mail address. You will receive e-mail notification when new information is available in [address] 9. Click Sign Up. You can now view and download portions of your medical record. 10. Click the Download Summary menu link to download a portable copy of your medical information. If you have questions, please visit the Frequently Asked Questions section of the [employer]cher website. Remember, [employer]cher is NOT to be used for urgent needs. For medical emergencies, dial 911. Now available from your iPhone and Android! Please provide this summary of care documentation to your next provider. Your primary care clinician is listed as [address]. If you have any questions after today's visit, please call [phone].

## 2018-05-21 NOTE — PROGRESS NOTES
1. Have you been to the ER, urgent care clinic since your last visit? Hospitalized since your last visit? No     2. Have you seen or consulted any other health care providers outside of the [address] [place] [address] since your last visit? Include any pap smears or colon screening.  No